# Patient Record
Sex: FEMALE | Race: BLACK OR AFRICAN AMERICAN | NOT HISPANIC OR LATINO
[De-identification: names, ages, dates, MRNs, and addresses within clinical notes are randomized per-mention and may not be internally consistent; named-entity substitution may affect disease eponyms.]

---
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
## 2021-07-02 ENCOUNTER — EMERGENCY (EMERGENCY)
Facility: HOSPITAL | Age: 21
LOS: 1 days | Discharge: HOME | End: 2021-07-02
Attending: STUDENT IN AN ORGANIZED HEALTH CARE EDUCATION/TRAINING PROGRAM
Payer: MEDICAID

## 2021-07-02 VITALS
RESPIRATION RATE: 19 BRPM | HEART RATE: 78 BPM | TEMPERATURE: 98 F | SYSTOLIC BLOOD PRESSURE: 110 MMHG | DIASTOLIC BLOOD PRESSURE: 68 MMHG

## 2021-07-02 VITALS
HEART RATE: 82 BPM | RESPIRATION RATE: 19 BRPM | SYSTOLIC BLOOD PRESSURE: 120 MMHG | DIASTOLIC BLOOD PRESSURE: 84 MMHG | TEMPERATURE: 98 F | WEIGHT: 293 LBS | OXYGEN SATURATION: 98 %

## 2021-07-02 LAB
ALBUMIN SERPL ELPH-MCNC: 3 G/DL — LOW (ref 3.5–5)
ALP SERPL-CCNC: 72 U/L — SIGNIFICANT CHANGE UP (ref 40–120)
ALT FLD-CCNC: 14 U/L DA — SIGNIFICANT CHANGE UP (ref 10–60)
ANION GAP SERPL CALC-SCNC: 8 MMOL/L — SIGNIFICANT CHANGE UP (ref 5–17)
AST SERPL-CCNC: 18 U/L — SIGNIFICANT CHANGE UP (ref 10–40)
BASOPHILS # BLD AUTO: 0.03 K/UL — SIGNIFICANT CHANGE UP (ref 0–0.2)
BASOPHILS NFR BLD AUTO: 0.4 % — SIGNIFICANT CHANGE UP (ref 0–2)
BILIRUB SERPL-MCNC: 0.3 MG/DL — SIGNIFICANT CHANGE UP (ref 0.2–1.2)
BUN SERPL-MCNC: 12 MG/DL — SIGNIFICANT CHANGE UP (ref 7–18)
CALCIUM SERPL-MCNC: 9 MG/DL — SIGNIFICANT CHANGE UP (ref 8.4–10.5)
CHLORIDE SERPL-SCNC: 106 MMOL/L — SIGNIFICANT CHANGE UP (ref 96–108)
CO2 SERPL-SCNC: 26 MMOL/L — SIGNIFICANT CHANGE UP (ref 22–31)
CREAT SERPL-MCNC: 0.87 MG/DL — SIGNIFICANT CHANGE UP (ref 0.5–1.3)
EOSINOPHIL # BLD AUTO: 0.03 K/UL — SIGNIFICANT CHANGE UP (ref 0–0.5)
EOSINOPHIL NFR BLD AUTO: 0.4 % — SIGNIFICANT CHANGE UP (ref 0–6)
GLUCOSE SERPL-MCNC: 100 MG/DL — HIGH (ref 70–99)
HCG SERPL-ACNC: <1 MIU/ML — SIGNIFICANT CHANGE UP
HCT VFR BLD CALC: 33.6 % — LOW (ref 34.5–45)
HGB BLD-MCNC: 10.2 G/DL — LOW (ref 11.5–15.5)
IMM GRANULOCYTES NFR BLD AUTO: 0.6 % — SIGNIFICANT CHANGE UP (ref 0–1.5)
LYMPHOCYTES # BLD AUTO: 1.64 K/UL — SIGNIFICANT CHANGE UP (ref 1–3.3)
LYMPHOCYTES # BLD AUTO: 24.5 % — SIGNIFICANT CHANGE UP (ref 13–44)
MAGNESIUM SERPL-MCNC: 2.1 MG/DL — SIGNIFICANT CHANGE UP (ref 1.6–2.6)
MCHC RBC-ENTMCNC: 22.5 PG — LOW (ref 27–34)
MCHC RBC-ENTMCNC: 30.4 GM/DL — LOW (ref 32–36)
MCV RBC AUTO: 74.2 FL — LOW (ref 80–100)
MONOCYTES # BLD AUTO: 0.5 K/UL — SIGNIFICANT CHANGE UP (ref 0–0.9)
MONOCYTES NFR BLD AUTO: 7.5 % — SIGNIFICANT CHANGE UP (ref 2–14)
NEUTROPHILS # BLD AUTO: 4.46 K/UL — SIGNIFICANT CHANGE UP (ref 1.8–7.4)
NEUTROPHILS NFR BLD AUTO: 66.6 % — SIGNIFICANT CHANGE UP (ref 43–77)
NRBC # BLD: 0 /100 WBCS — SIGNIFICANT CHANGE UP (ref 0–0)
PLATELET # BLD AUTO: 384 K/UL — SIGNIFICANT CHANGE UP (ref 150–400)
POTASSIUM SERPL-MCNC: 3.9 MMOL/L — SIGNIFICANT CHANGE UP (ref 3.5–5.3)
POTASSIUM SERPL-SCNC: 3.9 MMOL/L — SIGNIFICANT CHANGE UP (ref 3.5–5.3)
PROT SERPL-MCNC: 7.8 G/DL — SIGNIFICANT CHANGE UP (ref 6–8.3)
RBC # BLD: 4.53 M/UL — SIGNIFICANT CHANGE UP (ref 3.8–5.2)
RBC # FLD: 14.8 % — HIGH (ref 10.3–14.5)
SODIUM SERPL-SCNC: 140 MMOL/L — SIGNIFICANT CHANGE UP (ref 135–145)
TSH SERPL-MCNC: 1.52 UU/ML — SIGNIFICANT CHANGE UP (ref 0.34–4.82)
WBC # BLD: 6.7 K/UL — SIGNIFICANT CHANGE UP (ref 3.8–10.5)
WBC # FLD AUTO: 6.7 K/UL — SIGNIFICANT CHANGE UP (ref 3.8–10.5)

## 2021-07-02 PROCEDURE — 36415 COLL VENOUS BLD VENIPUNCTURE: CPT

## 2021-07-02 PROCEDURE — 99283 EMERGENCY DEPT VISIT LOW MDM: CPT

## 2021-07-02 PROCEDURE — 83735 ASSAY OF MAGNESIUM: CPT

## 2021-07-02 PROCEDURE — 80053 COMPREHEN METABOLIC PANEL: CPT

## 2021-07-02 PROCEDURE — 85025 COMPLETE CBC W/AUTO DIFF WBC: CPT

## 2021-07-02 PROCEDURE — 84702 CHORIONIC GONADOTROPIN TEST: CPT

## 2021-07-02 PROCEDURE — 84443 ASSAY THYROID STIM HORMONE: CPT

## 2021-07-02 PROCEDURE — 99284 EMERGENCY DEPT VISIT MOD MDM: CPT

## 2021-07-02 PROCEDURE — 93005 ELECTROCARDIOGRAM TRACING: CPT

## 2021-07-02 RX ORDER — SODIUM CHLORIDE 9 MG/ML
1000 INJECTION INTRAMUSCULAR; INTRAVENOUS; SUBCUTANEOUS ONCE
Refills: 0 | Status: COMPLETED | OUTPATIENT
Start: 2021-07-02 | End: 2021-07-02

## 2021-07-02 RX ADMIN — SODIUM CHLORIDE 1000 MILLILITER(S): 9 INJECTION INTRAMUSCULAR; INTRAVENOUS; SUBCUTANEOUS at 09:53

## 2021-07-02 NOTE — ED PROVIDER NOTE - PATIENT PORTAL LINK FT
You can access the FollowMyHealth Patient Portal offered by [facility] by registering at the following website: [email or web address]. By joining [employer]’s FollowMyHealth portal, you will also be able to view your health information using other applications (apps) compatible with our system.

## 2021-07-02 NOTE — ED PROVIDER NOTE - CLINICAL SUMMARY MEDICAL DECISION MAKING FREE TEXT BOX
21F presenting with lightheadedness, no syncope. has not been drinking water. likely dehydration. labs, fluids, will reassess.

## 2021-07-02 NOTE — ED PROVIDER NOTE - NSFOLLOWUPINSTRUCTIONS_ED_ALL_ED_FT
You were seen in the emegency department for lightheadedness.     Please follow-up with You were seen in the emergency department for lightheadedness.     Please follow-up with your primary care doctor in the next 24-48 hours.     Please continue to drink plenty of fluids.     If you have any worsening symptoms, severe headache, chest pain, shortness of breath or dizziness, please return ot the emergency department.

## 2021-10-27 ENCOUNTER — APPOINTMENT (OUTPATIENT)
Dept: SURGERY | Facility: CLINIC | Age: 21
End: 2021-10-27
Payer: COMMERCIAL

## 2021-10-27 VITALS
OXYGEN SATURATION: 100 % | DIASTOLIC BLOOD PRESSURE: 78 MMHG | WEIGHT: 293 LBS | SYSTOLIC BLOOD PRESSURE: 117 MMHG | HEIGHT: 63 IN | BODY MASS INDEX: 51.91 KG/M2 | HEART RATE: 71 BPM

## 2021-10-27 VITALS — TEMPERATURE: 96.8 F

## 2021-10-27 DIAGNOSIS — R42 DIZZINESS AND GIDDINESS: ICD-10-CM

## 2021-10-27 PROCEDURE — 99204 OFFICE O/P NEW MOD 45 MIN: CPT

## 2021-10-27 NOTE — HISTORY OF PRESENT ILLNESS
[de-identified] : JOSÉ MANUEL UMAÑA is a 21 year old female who present in the office with morbid obesity (BMI  55 kg/m2)  for bariatric surgery consultation. Patient was referred by MD Cristal Draper, The patient has tried multiple methods to lose weight in the past including diets, Calorie-counting, restricted intake, portion control and exercise without any long term success. The patient has been obese for many years. Patient feels that weight loss surgery is the only option at this point for effective and clinically meaningful weight loss. Patient seek weight loss surgery for improvement of their activity level, health and comorbid conditions. Her PMHX includes Asthma, Acid Reflux not on any medications, Anxiety, Depression.  Patient stated that she is not sure of which surgery she is interest at this time.

## 2021-10-27 NOTE — ASSESSMENT
[FreeTextEntry1] : JOSÉ MANUEL UMAÑA is a 21 year old female with morbid obesity (BMI  55.09 kg/m2) for Bariatric Surgery--she is unsure of the procedure.  Risks, benefits alternatives discussed. All questions answered. Risks discussed included: death, leak, stricture, DVT/PE, portomesenteric venous thrombosis, need for further procedures, tests or surgery, vitamin deficiency, gallstones, renal stones, inadequate weight loss, weight regain, need for open procedure, incisional hernia, bleeding, post-operative nausea/vomiting requiring hospitalization and intestinal obstruction.\par \par \par 1) Standard preoperative bariatric workup to include cardiology and pulmonology clearance, upper endoscopy with biopsy for H pylori, and preoperative fasting blood work, abdominal Ultrasound.\par 2) Patient will need to meet with psychologist and nutritionist for preoperative counseling regarding dietary goals, lifestyle changes, and postoperative expectations\par 3) We will assess the need for a medically-supervised diet, if required by the patient's insurer\par 4) Patient must attend 2 information session with bariatric coordinator and team\par 5) Patient should call insurance to ensure coverage for bariatric surgery\par 6) Bariatric Coordinator \par 7) Follow up 3 month \par 8) Patient wishes to have her weigh ins done with PCP

## 2021-10-27 NOTE — CONSULT LETTER
[Dear  ___] : Dear  [unfilled], [Consult Letter:] : I had the pleasure of evaluating your patient, [unfilled]. [Consult Closing:] : Thank you very much for allowing me to participate in the care of this patient.  If you have any questions, please do not hesitate to contact me. [Sincerely,] : Sincerely, [FreeTextEntry3] : Dr Garcia

## 2021-10-27 NOTE — PHYSICAL EXAM
[Obese] : obese [Normal] : affect appropriate [de-identified] : Normoactive bowel sounds, soft and nontender, no hepatosplenomegaly or masses noted,

## 2021-12-14 ENCOUNTER — APPOINTMENT (OUTPATIENT)
Dept: BARIATRICS | Facility: CLINIC | Age: 21
End: 2021-12-14

## 2021-12-27 ENCOUNTER — APPOINTMENT (OUTPATIENT)
Dept: CARDIOLOGY | Facility: CLINIC | Age: 21
End: 2021-12-27

## 2022-01-10 ENCOUNTER — APPOINTMENT (OUTPATIENT)
Dept: CARDIOLOGY | Facility: CLINIC | Age: 22
End: 2022-01-10
Payer: COMMERCIAL

## 2022-01-10 PROCEDURE — 99204 OFFICE O/P NEW MOD 45 MIN: CPT | Mod: 95

## 2022-01-14 ENCOUNTER — APPOINTMENT (OUTPATIENT)
Dept: GASTROENTEROLOGY | Facility: CLINIC | Age: 22
End: 2022-01-14
Payer: COMMERCIAL

## 2022-01-14 VITALS
OXYGEN SATURATION: 98 % | SYSTOLIC BLOOD PRESSURE: 115 MMHG | HEIGHT: 63 IN | HEART RATE: 94 BPM | TEMPERATURE: 97.8 F | BODY MASS INDEX: 51.91 KG/M2 | DIASTOLIC BLOOD PRESSURE: 69 MMHG | WEIGHT: 293 LBS

## 2022-01-14 DIAGNOSIS — Z01.818 ENCOUNTER FOR OTHER PREPROCEDURAL EXAMINATION: ICD-10-CM

## 2022-01-14 DIAGNOSIS — R11.0 NAUSEA: ICD-10-CM

## 2022-01-14 DIAGNOSIS — K62.5 HEMORRHAGE OF ANUS AND RECTUM: ICD-10-CM

## 2022-01-14 DIAGNOSIS — R19.7 DIARRHEA, UNSPECIFIED: ICD-10-CM

## 2022-01-14 PROCEDURE — 99204 OFFICE O/P NEW MOD 45 MIN: CPT

## 2022-01-14 NOTE — ASSESSMENT
[FreeTextEntry1] : Dyspepsia: The patient complains of dyspeptic symptoms.  The patient was advised to abide by an anti-gas (low FOD-MAP) diet.  The patient was given a pamphlet for anti-gas (low FOD-MAP).  The patient and I reviewed the anti-gas (low FOD-MAP) diet at length. The patient is to start on a trial of Simethicone one tablet 4 times a day p.r.n. abdominal pain and gas.\par GERD: The patient was advised to avoid late-night meals and dietary indiscretions.  The patient was advised to avoid fried and fatty foods.  The patient was advised to abide by an anti-GERD diet. The patient was given a pamphlet for anti-GERD.  The patient and I reviewed the anti-GERD diet at length. I recommend a trial of Pepcid 40 mg twice a day x 3 months for the symptoms.\par Nausea: The patient complains of nausea. If the symptoms of nausea persists, the patient may require a trial of Zofran 4 mg twice a day. \par Diarrhea: The patient complains of diarrhea.  I recommend a low residue diet. The patient is to avoid fiber supplementation. The patient is to consider starting a trial of a probiotic such as Align once a day.     The patient agreed and will follow-up to reassess the symptoms.  \par Rectal Bleeding: The patient had episodes of rectal bleeding.  The etiology of the rectal bleeding is unclear.  I recommend a trial of Anusol HC suppositories one per rectum QHS and Anusol HC 2.5% cream apply to affected area twice a day PRN hemorrhoidal bleeding or pain.  I recommend a trial of Calmoseptine cream apply to affected area twice a day for rectal discomfort. I recommend Tucks pads for the hemorrhoids.  I recommend starting Sitz baths twice a day for the hemorrhoids.  I recommend avoid wearing tight underwear and use boxers. I recommend avoid touching the perineal area. If the symptoms persist, I recommend a surgical evaluation for possible band ligation of the hemorrhoids. I recommend a flexible sigmoidoscopy to assess the site of bleeding. The patient was told of the risks and benefits of the procedure.  The patient was told of the risks of perforation, emergency surgery, bleeding, infections and missed lesions. The patient agrees and will follow up for the procedure.\par Obesity: The patient is morbidly obese.  The patient was advised to lose weight and exercise.  The patient was advised to followup with a nutritionist regarding dieting for the weight loss. The patient cannot lose weight.  The patient is being evaluated with bariatric surgeon.   The patient is being followed for bariatric surgery for obesity with Dr. Garcia for possible gastric sleeve surgery.  The patient agrees and will follow-up.  I recommend an upper endoscopy to assess for peptic ulcer disease versus esophagitis and any contraindications for bariatric surgery.  The patient was told of the risks and benefits of the procedure. The patient was told there was perforation, emergency surgery, bleeding, infections and missed lesions. The patient agreed and will schedule for the procedure. The patient was told to be n.p.o. after midnight. The patient is to return to the procedure.\par Upper Endoscopy: I recommend an upper endoscopy to assess for peptic ulcer disease versus esophagitis.  The patient was told of the risks and benefits of the procedure.  The patient was told of the risks of perforation, emergency surgery, bleeding, infections and missed lesions. The patient is to have a COVID 19 PCR nasopharyngeal swab performed at the approved sites 3 days prior to the procedure.  The patient is told not to drive, drink alcohol, use recreational drugs, exercise, or work the day of the procedure.  The patient was told of the need for an escort to accompany the patient home after the procedure. The patient is aware that the procedure may be cancelled if they fail to follow the directions.  The patient agreed and will schedule for the procedure. The patient is to be n.p.o. after midnight.  The patient is to return for the procedure.\par Sigmoidscopy: I recommend a flexible sigmoidoscopy to assess the symptoms of rectal bleeding.  The patient was told of the risks and benefits of the procedure.  The patient was told of the risks of perforation, emergency surgery, bleeding, infections and missed lesions.  The patient is to be on a clear liquid diet the entire day prior to the procedure. The patient is to complete the entire prescribed bowel prep the day prior to the procedure as directed. The patient is to have a COVID 19 PCR nasopharyngeal swab performed at the approved sites 3 days prior to the procedure.  The patient is told not to drive, drink alcohol, use recreational drugs, exercise, or work the day of the procedure.  The patient was told of the need for an escort to accompany the patient home after the procedure. The patient is aware that the procedure may be cancelled if they fail to follow the directions.  The patient agreed and will schedule for the procedure. The patient is to be n.p.o. after midnight and bowel prep was given.  The patient is to return for the procedure.\par Follow-up: The patient is to follow-up in the office in 4 weeks to reassess the symptoms. The patient was told to call the office if any further problems.\par \par \par \par \par \par

## 2022-01-25 ENCOUNTER — APPOINTMENT (OUTPATIENT)
Dept: PULMONOLOGY | Facility: CLINIC | Age: 22
End: 2022-01-25
Payer: COMMERCIAL

## 2022-01-25 VITALS — TEMPERATURE: 96.8 F

## 2022-01-25 VITALS — TEMPERATURE: 96.6 F

## 2022-01-25 DIAGNOSIS — G47.19 OTHER HYPERSOMNIA: ICD-10-CM

## 2022-01-25 DIAGNOSIS — R06.83 SNORING: ICD-10-CM

## 2022-01-25 PROCEDURE — 94010 BREATHING CAPACITY TEST: CPT

## 2022-01-25 PROCEDURE — 94729 DIFFUSING CAPACITY: CPT

## 2022-01-25 PROCEDURE — 94726 PLETHYSMOGRAPHY LUNG VOLUMES: CPT

## 2022-01-25 PROCEDURE — 99204 OFFICE O/P NEW MOD 45 MIN: CPT | Mod: 25

## 2022-01-25 PROCEDURE — ZZZZZ: CPT

## 2022-01-27 ENCOUNTER — TRANSCRIPTION ENCOUNTER (OUTPATIENT)
Age: 22
End: 2022-01-27

## 2022-02-17 ENCOUNTER — NON-APPOINTMENT (OUTPATIENT)
Age: 22
End: 2022-02-17

## 2022-03-09 ENCOUNTER — FORM ENCOUNTER (OUTPATIENT)
Age: 22
End: 2022-03-09

## 2022-03-10 ENCOUNTER — APPOINTMENT (OUTPATIENT)
Dept: SLEEP CENTER | Facility: CLINIC | Age: 22
End: 2022-03-10
Payer: COMMERCIAL

## 2022-03-10 ENCOUNTER — OUTPATIENT (OUTPATIENT)
Dept: OUTPATIENT SERVICES | Facility: HOSPITAL | Age: 22
LOS: 1 days | End: 2022-03-10
Payer: COMMERCIAL

## 2022-03-10 PROCEDURE — 95806 SLEEP STUDY UNATT&RESP EFFT: CPT

## 2022-03-10 PROCEDURE — 95806 SLEEP STUDY UNATT&RESP EFFT: CPT | Mod: 26

## 2022-03-17 ENCOUNTER — APPOINTMENT (OUTPATIENT)
Dept: ENDOCRINOLOGY | Facility: CLINIC | Age: 22
End: 2022-03-17
Payer: COMMERCIAL

## 2022-03-17 PROCEDURE — ZZZZZ: CPT

## 2022-03-17 NOTE — END OF VISIT
[] : Resident [FreeTextEntry3] : no clinical evidence of pulmonary pathology, good exercise tolerance. \par asthma well controlled, no clinically evident\par Pt reports significant snoring and daytime sleepiness, and should undergo a diagnostic home sleep study to assess for any evidence of NADINE. If found to be moderate or severe, would recommend 2-3 weeks of appropriate CPAP treatment prior to any surgical intervention.

## 2022-03-17 NOTE — HISTORY OF PRESENT ILLNESS
[Never] : never [TextBox_4] : Patient is a 21 year old female, with PMH of asthma, vertigo and morbid obesity, who presented to the clinic for clearance for bariatric surgery. Patient states her history of asthma is old and does not bother her currently. Denies any recent albuterol use. Denies any medication use. Denies any allergies. Denies any smoking or use of illicit drugs or vaping. Social alcohol use. Patient states that she wakes up throughout the night most nights. Most of the time patient is able to fall back asleep right away. Sometimes patient feels sleepy during the day. Denies any sensation of gasping for air. Has some SOB on exertion. Denies any chest pain, nausea, vomiting, abdominal pain, diarrhea or constipation. Patient is following with Dr. Garcia for bariatric surgery. Patient is in process of seeing nutritionist. Patient has seen cardiology and GI. Patient planned to undergo EGD on 3/3/22. Patient also planned to undergo stress test. Patient still needs to see psychiatrist.

## 2022-03-17 NOTE — CONSULT LETTER
[Dear  ___] : Dear  [unfilled], [Consult Letter:] : I had the pleasure of evaluating your patient, [unfilled]. [Please see my note below.] : Please see my note below. [Consult Closing:] : Thank you very much for allowing me to participate in the care of this patient.  If you have any questions, please do not hesitate to contact me. [Sincerely,] : Sincerely, [FreeTextEntry3] : Rosio Gonzalez MD, [facility]

## 2022-03-17 NOTE — ASSESSMENT
[FreeTextEntry1] : Patient is a 21 year old female, with PMH of asthma, vertigo and morbid obesity, who presented to the clinic for clearance for bariatric surgery.\par \par no clinical evidence of pulmonary pathology, good exercise tolerance. \par PFTs done in office showing borderline obstructive defect \par CXR clear\par HST with mild NADINE, no CPAP treatment indicated at this time, weight loss recommended as primary therapy\par \par \par Pt is optimized from pulmonary and sleep standpoint to undergo elective bariatric surgery under general anesthesia - recommend routine paul and postop care.

## 2022-03-17 NOTE — REASON FOR VISIT
[Initial] : an initial visit [Pre-op Risk Stratification] : pre-op risk stratification [TextBox_44] : bariatric surgery

## 2022-03-21 DIAGNOSIS — G47.33 OBSTRUCTIVE SLEEP APNEA (ADULT) (PEDIATRIC): ICD-10-CM

## 2022-03-22 ENCOUNTER — TRANSCRIPTION ENCOUNTER (OUTPATIENT)
Age: 22
End: 2022-03-22

## 2022-03-24 ENCOUNTER — OUTPATIENT (OUTPATIENT)
Dept: OUTPATIENT SERVICES | Facility: HOSPITAL | Age: 22
LOS: 1 days | Discharge: HOME | End: 2022-03-24
Payer: COMMERCIAL

## 2022-03-24 ENCOUNTER — APPOINTMENT (OUTPATIENT)
Dept: GASTROENTEROLOGY | Facility: HOSPITAL | Age: 22
End: 2022-03-24

## 2022-03-24 ENCOUNTER — RESULT REVIEW (OUTPATIENT)
Age: 22
End: 2022-03-24

## 2022-03-24 DIAGNOSIS — K62.5 HEMORRHAGE OF ANUS AND RECTUM: ICD-10-CM

## 2022-03-24 DIAGNOSIS — K21.9 GASTRO-ESOPHAGEAL REFLUX DISEASE WITHOUT ESOPHAGITIS: ICD-10-CM

## 2022-03-24 DIAGNOSIS — E66.01 MORBID (SEVERE) OBESITY DUE TO EXCESS CALORIES: ICD-10-CM

## 2022-03-24 DIAGNOSIS — R10.13 EPIGASTRIC PAIN: ICD-10-CM

## 2022-03-24 DIAGNOSIS — R11.0 NAUSEA: ICD-10-CM

## 2022-03-24 LAB — HCG UR QL: NEGATIVE — SIGNIFICANT CHANGE UP

## 2022-03-24 PROCEDURE — 43239 EGD BIOPSY SINGLE/MULTIPLE: CPT

## 2022-03-24 PROCEDURE — 45385 COLONOSCOPY W/LESION REMOVAL: CPT

## 2022-03-24 PROCEDURE — 45331 SIGMOIDOSCOPY AND BIOPSY: CPT

## 2022-03-24 PROCEDURE — 88305 TISSUE EXAM BY PATHOLOGIST: CPT

## 2022-03-24 PROCEDURE — 81025 URINE PREGNANCY TEST: CPT

## 2022-03-24 PROCEDURE — 88312 SPECIAL STAINS GROUP 1: CPT

## 2022-03-24 PROCEDURE — 88312 SPECIAL STAINS GROUP 1: CPT | Mod: 26

## 2022-03-24 PROCEDURE — 88305 TISSUE EXAM BY PATHOLOGIST: CPT | Mod: 26

## 2022-03-24 DEVICE — CATH BALLOON DIL 6-8MM: Type: IMPLANTABLE DEVICE | Status: FUNCTIONAL

## 2022-03-24 DEVICE — CATH ESOPH DIL 9 ATM 6FR 8-10MM: Type: IMPLANTABLE DEVICE | Status: FUNCTIONAL

## 2022-03-24 DEVICE — CATH ESOPH DIL 8 ATM 6FR10-12M: Type: IMPLANTABLE DEVICE | Status: FUNCTIONAL

## 2022-03-28 ENCOUNTER — TRANSCRIPTION ENCOUNTER (OUTPATIENT)
Age: 22
End: 2022-03-28

## 2022-03-29 LAB — SURGICAL PATHOLOGY STUDY: SIGNIFICANT CHANGE UP

## 2022-03-30 ENCOUNTER — NON-APPOINTMENT (OUTPATIENT)
Age: 22
End: 2022-03-30

## 2022-04-11 ENCOUNTER — APPOINTMENT (OUTPATIENT)
Dept: ENDOCRINOLOGY | Facility: CLINIC | Age: 22
End: 2022-04-11
Payer: COMMERCIAL

## 2022-04-11 PROCEDURE — ZZZZZ: CPT

## 2022-04-14 ENCOUNTER — APPOINTMENT (OUTPATIENT)
Dept: CV DIAGNOSTICS | Facility: HOSPITAL | Age: 22
End: 2022-04-14

## 2022-04-22 ENCOUNTER — APPOINTMENT (OUTPATIENT)
Dept: GASTROENTEROLOGY | Facility: CLINIC | Age: 22
End: 2022-04-22
Payer: COMMERCIAL

## 2022-04-22 VITALS
OXYGEN SATURATION: 100 % | SYSTOLIC BLOOD PRESSURE: 116 MMHG | DIASTOLIC BLOOD PRESSURE: 70 MMHG | WEIGHT: 293 LBS | BODY MASS INDEX: 51.91 KG/M2 | TEMPERATURE: 96.3 F | HEART RATE: 85 BPM | HEIGHT: 63 IN

## 2022-04-22 DIAGNOSIS — K63.5 POLYP OF COLON: ICD-10-CM

## 2022-04-22 DIAGNOSIS — K29.30 CHRONIC SUPERFICIAL GASTRITIS W/OUT BLEEDING: ICD-10-CM

## 2022-04-22 DIAGNOSIS — K64.8 OTHER HEMORRHOIDS: ICD-10-CM

## 2022-04-22 PROCEDURE — 99213 OFFICE O/P EST LOW 20 MIN: CPT

## 2022-04-22 RX ORDER — PANTOPRAZOLE 40 MG/1
40 TABLET, DELAYED RELEASE ORAL DAILY
Qty: 30 | Refills: 3 | Status: ACTIVE | COMMUNITY
Start: 2022-04-22 | End: 1900-01-01

## 2022-04-22 NOTE — HISTORY OF PRESENT ILLNESS
[None] : had no significant interval events [Heartburn] : denies heartburn [Nausea] : denies nausea [Vomiting] : denies vomiting [Diarrhea] : denies diarrhea [Constipation] : denies constipation [Yellow Skin Or Eyes (Jaundice)] : denies jaundice [Abdominal Pain] : denies abdominal pain [Abdominal Swelling] : denies abdominal swelling [Rectal Pain] : denies rectal pain [Hiatus Hernia] : hiatus hernia [Wt Gain ___ Lbs] : no recent weight gain [Wt Loss ___ Lbs] : no recent weight loss [GERD] : no gastroesophageal reflux disease [Peptic Ulcer Disease] : no peptic ulcer disease [Pancreatitis] : no pancreatitis [Cholelithiasis] : no cholelithiasis [Kidney Stone] : no kidney stone [Inflammatory Bowel Disease] : no inflammatory bowel disease [Irritable Bowel Syndrome] : no irritable bowel syndrome [Diverticulitis] : no diverticulitis [Alcohol Abuse] : no alcohol abuse [Malignancy] : no malignancy [Abdominal Surgery] : no abdominal surgery [Appendectomy] : no appendectomy [Cholecystectomy] : no cholecystectomy [de-identified] : The patient states that she is feeling fine.  The patient is being followed for bariatric surgery for obesity with Dr. Garcia.  The patient is s/p upper endoscopy and biopsy for pre-operative evaluation for bariatric surgery. The patient denies any abdominal pain.  The patient denies any abdominal gas and bloating.  The patient denies any nausea or vomiting.  The patient denies any gastroesophageal reflux disease or dysphagia.  The patient complains of dyspnea upon exertion but denies any atypical chest pain, shortness of breath or palpitations.  The patient denies any diaphoresis. The patient denies any constipation or diarrhea.  The patient has 1 to 2 bowel movements a day. The patient denies a change in bowel habits.  The patient denies a change in caliber of stool.  The patient denies having mucus discharge with the bowel movements.  The patient denies any bright red blood per rectum, melena or hematemesis.  The patient denies any rectal pain or rectal pruritus.  The patient denies any weight loss or anorexia.  The patient denies any jaundice or pruritus.  The patient denies any chronic lower back pain.  She denies any fevers or chills.  The patient had an upper endoscopy and flexible sigmoidoscopy to the splenic flexure and transverse colon performed at the [facility] endoscopy suite on March 24, 2022. The upper endoscopy was performed up to the level of the second portion of the duodenum. The upper endoscopy revealed mild diffuse gastritis. Biopsies were taken of the distal esophagus, antrum, body of stomach and duodenum to assess for esophagitis, gastritis and duodenitis. The gastric pathology performed on March 24, 2022 revealed esophageal mucosa with fragments of unremarkable squamous esophageal epithelium with a PASF stain that is negative for fungal microorganisms (esophagus), gastric antral and body mucosa with chronic inactive gastritis that was negative for Helicobacter pylori (antrum and body of the stomach) and duodenal mucosa with preserved villous architecture with no parasites or increased intraepithelial lymphocytes identified (duodenum). The flexible sigmoidoscopy to the splenic flexure and transverse colon revealed a recto-sigmoid colon polyp, a poor prep flexible sigmoidoscopy and small internal hemorrhoids.  The study was limited secondary to retained liquid and solid stool scattered throughout the colon but no gross lesions were noted.  Unable to comment on small colonic polyps or masses secondary to the poor prep.  The colonic polyp was snared and removed.  There was minimal bleeding post polypectomy. The colonic polyp was lost in the stool and not retrieved.  There were no other polyps, masses, diverticulosis, AVMs or colitis noted.  The patient tolerated the procedures well.  The patient denies any significant family history of GI problems. [de-identified] : (-) smoking, (-) ETOH, (-) IVDA\par

## 2022-04-22 NOTE — ASSESSMENT
[FreeTextEntry1] : Gastritis: The patient has a history of gastritis noted on prior upper endoscopy. The patient is to avoid nonsteroidal anti-inflammatory drugs and aspirin. I recommend a trial of pantoprazole 40 mg once a day for 3 months for the symptoms. \par Colonic Polyp: The patient was found to have colonic polyps on prior colonoscopy.  I recommend a repeat colonoscopy in 5 years to reassess for colonic polyps pending patient’s health unless symptomatic.  The patient agreed and will follow up for the procedure. \par Internal Hemorrhoids: The patient is to consider starting a trial of Anusol H. C. suppositories one per rectum nightly and Anusol HC2 .5% cream apply to affected area twice a day p.r.n. hemorrhoidal bleeding or pain. I also recommend a trial of Calmoseptine cream apply to affected area twice a day for hemorrhoidal discomfort.  I recommend Tucks pads for the hemorrhoids.  I recommend Sitz baths twice a day for the hemorrhoids.  I recommend avoid wearing tight underwear and use boxers.  I recommend avoid touching the perineal area.  If the symptoms persist, I recommend a surgical evaluation for possible band ligation of the hemorrhoids. The patient was given the name of colorectal surgeon, Dr. Andrea Shaw at [facility] for possible surgery.   The patient agreed to followup. \par Poor Prep Flexible Sigmoidoscopy: The patient had a recent poor prep flexible sigmoidoscopy. I recommend a repeat flexible sigmoidoscopy versus colonoscopy in 5 years to reassess for colonic polyps secondary to the poor prep.  The patient was told of the risks and benefits of the procedure.  The patient was told of the risks of perforation, emergency surgery, bleeding, infections and missed lesions. The patient agreed and will return for the procedure.\par Obesity: The patient is morbidly obese.  The patient was advised to lose weight and exercise.  The patient was advised to continue followup with a nutritionist regarding dieting for the weight loss. The patient cannot lose weight.  The patient is s/p upper endoscopy and has no contraindications to proceeding with bariatric surgery.   The patient is being evaluated with bariatric surgeon.   The patient is being followed for bariatric surgery for obesity with Dr. Garcia.  The patient agrees and will followup.  \par Follow-up: The patient is to follow-up in the office in 6 months to reassess the symptoms. The patient was told to call the office if any further problems. \par

## 2022-04-29 ENCOUNTER — APPOINTMENT (OUTPATIENT)
Dept: CV DIAGNOSTICS | Facility: HOSPITAL | Age: 22
End: 2022-04-29

## 2022-04-29 ENCOUNTER — OUTPATIENT (OUTPATIENT)
Dept: OUTPATIENT SERVICES | Facility: HOSPITAL | Age: 22
LOS: 1 days | End: 2022-04-29
Payer: COMMERCIAL

## 2022-04-29 DIAGNOSIS — R07.9 CHEST PAIN, UNSPECIFIED: ICD-10-CM

## 2022-04-29 PROCEDURE — 93016 CV STRESS TEST SUPVJ ONLY: CPT | Mod: GC

## 2022-04-29 PROCEDURE — 93018 CV STRESS TEST I&R ONLY: CPT | Mod: GC

## 2022-05-12 ENCOUNTER — APPOINTMENT (OUTPATIENT)
Dept: CV DIAGNOSITCS | Facility: HOSPITAL | Age: 22
End: 2022-05-12
Payer: COMMERCIAL

## 2022-05-12 ENCOUNTER — OUTPATIENT (OUTPATIENT)
Dept: OUTPATIENT SERVICES | Facility: HOSPITAL | Age: 22
LOS: 1 days | End: 2022-05-12

## 2022-05-12 DIAGNOSIS — R06.00 DYSPNEA, UNSPECIFIED: ICD-10-CM

## 2022-05-12 DIAGNOSIS — Z01.810 ENCOUNTER FOR PREPROCEDURAL CARDIOVASCULAR EXAMINATION: ICD-10-CM

## 2022-05-12 LAB — HCG UR QL: NEGATIVE — SIGNIFICANT CHANGE UP

## 2022-05-12 PROCEDURE — 93306 TTE W/DOPPLER COMPLETE: CPT | Mod: 26

## 2022-05-16 ENCOUNTER — NON-APPOINTMENT (OUTPATIENT)
Age: 22
End: 2022-05-16

## 2022-05-16 ENCOUNTER — APPOINTMENT (OUTPATIENT)
Dept: CARDIOLOGY | Facility: CLINIC | Age: 22
End: 2022-05-16
Payer: COMMERCIAL

## 2022-05-16 VITALS
BODY MASS INDEX: 51.91 KG/M2 | HEART RATE: 79 BPM | RESPIRATION RATE: 16 BRPM | TEMPERATURE: 97.9 F | SYSTOLIC BLOOD PRESSURE: 98 MMHG | WEIGHT: 293 LBS | HEIGHT: 63 IN | DIASTOLIC BLOOD PRESSURE: 66 MMHG | OXYGEN SATURATION: 100 %

## 2022-05-16 DIAGNOSIS — Z13.6 ENCOUNTER FOR SCREENING FOR CARDIOVASCULAR DISORDERS: ICD-10-CM

## 2022-05-16 DIAGNOSIS — Z01.810 ENCOUNTER FOR PREPROCEDURAL CARDIOVASCULAR EXAMINATION: ICD-10-CM

## 2022-05-16 DIAGNOSIS — R07.89 OTHER CHEST PAIN: ICD-10-CM

## 2022-05-16 PROCEDURE — 99214 OFFICE O/P EST MOD 30 MIN: CPT

## 2022-05-16 PROCEDURE — 93000 ELECTROCARDIOGRAM COMPLETE: CPT

## 2022-05-24 ENCOUNTER — NON-APPOINTMENT (OUTPATIENT)
Age: 22
End: 2022-05-24

## 2022-06-06 NOTE — ED PROVIDER NOTE - NS ED MD DISPO DISCHARGE
"Chief Complaint   Patient presents with     Ear Problem     Ear check.  Follow up Infective OE right       Initial /64 (BP Location: Right arm, Cuff Size: Adult Regular)   Pulse 71   Temp 97.4  F (36.3  C) (Tympanic)   Ht 1.676 m (5' 6\")   Wt 86.2 kg (190 lb)   SpO2 99%   BMI 30.67 kg/m   Estimated body mass index is 30.67 kg/m  as calculated from the following:    Height as of this encounter: 1.676 m (5' 6\").    Weight as of this encounter: 86.2 kg (190 lb).  Medication Reconciliation: complete  Jacquie Palma LPN    "
Home

## 2022-06-14 ENCOUNTER — APPOINTMENT (OUTPATIENT)
Dept: SURGERY | Facility: CLINIC | Age: 22
End: 2022-06-14
Payer: COMMERCIAL

## 2022-06-14 VITALS
WEIGHT: 293 LBS | DIASTOLIC BLOOD PRESSURE: 76 MMHG | HEART RATE: 94 BPM | SYSTOLIC BLOOD PRESSURE: 135 MMHG | HEIGHT: 63 IN | BODY MASS INDEX: 51.91 KG/M2

## 2022-06-14 VITALS — TEMPERATURE: 97.3 F

## 2022-06-14 DIAGNOSIS — R06.00 DYSPNEA, UNSPECIFIED: ICD-10-CM

## 2022-06-14 DIAGNOSIS — R10.13 EPIGASTRIC PAIN: ICD-10-CM

## 2022-06-14 DIAGNOSIS — Z78.9 OTHER SPECIFIED HEALTH STATUS: ICD-10-CM

## 2022-06-14 DIAGNOSIS — K21.9 GASTRO-ESOPHAGEAL REFLUX DISEASE W/OUT ESOPHAGITIS: ICD-10-CM

## 2022-06-14 PROCEDURE — 99213 OFFICE O/P EST LOW 20 MIN: CPT

## 2022-06-14 RX ORDER — CEPHALEXIN 500 MG/1
500 CAPSULE ORAL
Qty: 30 | Refills: 0 | Status: DISCONTINUED | COMMUNITY
Start: 2022-03-17 | End: 2022-06-14

## 2022-06-15 DIAGNOSIS — E55.9 VITAMIN D DEFICIENCY, UNSPECIFIED: ICD-10-CM

## 2022-06-15 RX ORDER — ERGOCALCIFEROL 1.25 MG/1
1.25 MG CAPSULE, LIQUID FILLED ORAL
Qty: 5 | Refills: 2 | Status: DISCONTINUED | COMMUNITY
Start: 2022-06-15 | End: 2022-06-15

## 2022-06-15 RX ORDER — CHOLECALCIFEROL (VITAMIN D3) 1250 MCG
1.25 MG CAPSULE ORAL
Qty: 8 | Refills: 3 | Status: ACTIVE | COMMUNITY
Start: 2022-06-15 | End: 1900-01-01

## 2022-06-16 LAB
25(OH)D3 SERPL-MCNC: 10.1 NG/ML
ALBUMIN SERPL ELPH-MCNC: 4 G/DL
ALP BLD-CCNC: 83 U/L
ALT SERPL-CCNC: 11 U/L
ANION GAP SERPL CALC-SCNC: 12 MMOL/L
APTT BLD: 28.7 SEC
AST SERPL-CCNC: 20 U/L
BASOPHILS # BLD AUTO: 0.05 K/UL
BASOPHILS NFR BLD AUTO: 0.7 %
BILIRUB SERPL-MCNC: <0.2 MG/DL
BUN SERPL-MCNC: 12 MG/DL
CALCIUM SERPL-MCNC: 9.8 MG/DL
CHLORIDE SERPL-SCNC: 103 MMOL/L
CHOLEST SERPL-MCNC: 187 MG/DL
CO2 SERPL-SCNC: 24 MMOL/L
CREAT SERPL-MCNC: 1 MG/DL
EGFR: 82 ML/MIN/1.73M2
EOSINOPHIL # BLD AUTO: 0.05 K/UL
EOSINOPHIL NFR BLD AUTO: 0.7 %
ESTIMATED AVERAGE GLUCOSE: 120 MG/DL
FERRITIN SERPL-MCNC: 59 NG/ML
FOLATE SERPL-MCNC: 4 NG/ML
GLUCOSE SERPL-MCNC: 87 MG/DL
HBA1C MFR BLD HPLC: 5.8 %
HCT VFR BLD CALC: 35.9 %
HDLC SERPL-MCNC: 40 MG/DL
HGB BLD-MCNC: 10.5 G/DL
IMM GRANULOCYTES NFR BLD AUTO: 0.4 %
INR PPP: 1.04 RATIO
IRON SATN MFR SERPL: 18 %
IRON SERPL-MCNC: 61 UG/DL
LDLC SERPL CALC-MCNC: 123 MG/DL
LYMPHOCYTES # BLD AUTO: 2.07 K/UL
LYMPHOCYTES NFR BLD AUTO: 30.4 %
MAN DIFF?: NORMAL
MCHC RBC-ENTMCNC: 22.3 PG
MCHC RBC-ENTMCNC: 29.2 GM/DL
MCV RBC AUTO: 76.2 FL
MONOCYTES # BLD AUTO: 0.63 K/UL
MONOCYTES NFR BLD AUTO: 9.3 %
NEUTROPHILS # BLD AUTO: 3.98 K/UL
NEUTROPHILS NFR BLD AUTO: 58.5 %
NONHDLC SERPL-MCNC: 147 MG/DL
PLATELET # BLD AUTO: 413 K/UL
POTASSIUM SERPL-SCNC: 4.5 MMOL/L
PROT SERPL-MCNC: 7.6 G/DL
PT BLD: 12.3 SEC
RBC # BLD: 4.71 M/UL
RBC # FLD: 15.4 %
SODIUM SERPL-SCNC: 139 MMOL/L
TIBC SERPL-MCNC: 334 UG/DL
TRIGL SERPL-MCNC: 119 MG/DL
TSH SERPL-ACNC: 1.93 UIU/ML
UIBC SERPL-MCNC: 273 UG/DL
VIT B12 SERPL-MCNC: 370 PG/ML
WBC # FLD AUTO: 6.81 K/UL

## 2022-06-20 LAB — VIT B1 SERPL-MCNC: 82.5 NMOL/L

## 2022-06-29 ENCOUNTER — NON-APPOINTMENT (OUTPATIENT)
Age: 22
End: 2022-06-29

## 2022-07-09 NOTE — ASSESSMENT
[FreeTextEntry1] : JOSÉ MANUEL UMAÑA is a 22 year old female with morbid obesity (BMI 56.51 kg/m2) who present in the office for pre-operative follow-up and weight management program in preparation for Robotic assisted laparoscopic duodenal switch\par \par \par Today patient is doing well, offers no complaints. Patient stated that she has all the clearances. Patient was advised that her chart will be reviewed and We will plan for surgery on at the next available date, pending any required insurance pre-certification or pre-approval. O-L Flap Text: The defect edges were debeveled with a #15 scalpel blade.  Given the location of the defect, shape of the defect and the proximity to free margins an O-L flap was deemed most appropriate.  Using a sterile surgical marker, an appropriate advancement flap was drawn incorporating the defect and placing the expected incisions within the relaxed skin tension lines where possible.    The area thus outlined was incised deep to adipose tissue with a #15 scalpel blade.  The skin margins were undermined to an appropriate distance in all directions utilizing iris scissors.

## 2022-07-09 NOTE — PLAN
[FreeTextEntry1] : Please follow up at the office for preop visit and as needed for any questions or concerns

## 2022-07-09 NOTE — CONSULT LETTER
[Dear  ___] : Dear  [unfilled], [Courtesy Letter:] : I had the pleasure of seeing your patient, [unfilled], in my office today. [Consult Closing:] : Thank you very much for allowing me to participate in the care of this patient.  If you have any questions, please do not hesitate to contact me. [Sincerely,] : Sincerely, [FreeTextEntry3] : Dr Mcqueen

## 2022-07-09 NOTE — PHYSICAL EXAM
[Obese] : obese [Normal] : affect appropriate [de-identified] : Normoactive bowel sounds, soft and nontender, no hepatosplenomegaly or masses noted,

## 2022-07-09 NOTE — HISTORY OF PRESENT ILLNESS
[de-identified] : JOSÉ MANUEL UMAÑA is a 22 year old female with morbid obesity (BMI 56.51 kg/m2) who present in the office for pre-operative follow-up and weight management program in preparation for Robotic assisted laparoscopic duodenal switch. Patient seeing by following specialties\par \par - Gastroenterology underwent an upper endoscopy. pathology was negative for H. pylori. \par - Pulmonary underwent PFT, Sleep study. Cleared to proceed with bariatric surgery\par - Cardiology- From the cardiac perspective, the patient may proceed with bariatric surgery\par - Psychology- cleared to proceed with bariatric surgery\par - Registered Dietitian proceed with bariatric surgery\par - She has attended our bariatric information session\par - Labs will be done today

## 2022-07-11 ENCOUNTER — TRANSCRIPTION ENCOUNTER (OUTPATIENT)
Age: 22
End: 2022-07-11

## 2022-07-21 ENCOUNTER — EMERGENCY (EMERGENCY)
Facility: HOSPITAL | Age: 22
LOS: 1 days | Discharge: HOME | End: 2022-07-21
Attending: EMERGENCY MEDICINE
Payer: COMMERCIAL

## 2022-07-21 VITALS
HEART RATE: 96 BPM | TEMPERATURE: 98 F | DIASTOLIC BLOOD PRESSURE: 68 MMHG | WEIGHT: 293 LBS | OXYGEN SATURATION: 97 % | SYSTOLIC BLOOD PRESSURE: 103 MMHG | RESPIRATION RATE: 18 BRPM | HEIGHT: 63 IN

## 2022-07-21 PROCEDURE — 10061 I&D ABSCESS COMP/MULTIPLE: CPT

## 2022-07-21 PROCEDURE — 99283 EMERGENCY DEPT VISIT LOW MDM: CPT | Mod: 25

## 2022-07-21 PROCEDURE — 99282 EMERGENCY DEPT VISIT SF MDM: CPT | Mod: 25

## 2022-07-21 NOTE — ED PROVIDER NOTE - OBJECTIVE STATEMENT
23 y/o female, history of vertigo, presents for left axillary cyst vs abscess. Patient states that she noticed it a week ago. It became firm and started draining, however still present in the left axilla. Went to her PCP who sent her to the ER to get cyst drained. Is not taking any antibiotics. Denies any fevers, chills. No known drug allergies.

## 2022-07-21 NOTE — ED PROVIDER NOTE - NS ED ATTENDING STATEMENT MOD
This was a shared visit with the LIO. I reviewed and verified the documentation and independently performed the documented:

## 2022-07-21 NOTE — ED ADULT NURSE NOTE - ISOLATION TYPE:
PORSHA on CKD 4  Baseline creatinine 3 3-3 8 in past year  Patient follows Dr Kayode Selby as outpatient  On Demadex 40mg p o  Daily in AM and can take additional 20 mg p o  In the afternoon if leg edema or weight gain  Patient states she barely takes additional dose in the afternoon  Creatinine 6 32 on presentation with Anion gap 18, bicarb 19, potassium normal  ABG showed metabolic acidosis  No significant improvement with IV fluid, creatinine remains 6 1  Now deemed ESRD  CPK within normal limit  Nephrology following, input appreciated  Likely disease progression possibly multifactorial secondary to decrease ECV due to pulmonary hypertension,? Anticoagulated related nephropathy, progressive nephrotic range proteinuria  Status post IR guided PD catheter placement on 07/17  Reports mild pain at the site of PD catheter  · Continue Demadex  · Daily weight and I&Os  · Follow-up labs  · Avoid nephrotoxins and hypotension  · Plan to initiate PD next week as outpatient, scheduled for 7/20 at 2:30 p m   At [facility]  · Follow-up with Nephrology after discharge      Results from last 7 days   Lab Units 07/19/20  0506 07/18/20  0546 07/17/20  0454 07/16/20  0557 07/15/20  0615 07/14/20  0549 07/13/20  1317   BUN mg/dL 98* 93* 100* 101* 100* 96* 95*   CREATININE mg/dL 6 45* 5 95* 6 06* 6 10* 6 19* 6 22* 6 32* None

## 2022-07-21 NOTE — ED PROVIDER NOTE - CLINICAL SUMMARY MEDICAL DECISION MAKING FREE TEXT BOX
Cyst drained without complication.  Patient to f/u with plastics, will start patient on abx due to surrounding cellulitis.  Strict return precautions provided, education on abx also provided.

## 2022-07-21 NOTE — ED PROVIDER NOTE - PATIENT PORTAL LINK FT
You can access the FollowMyHealth Patient Portal offered by [facility] by registering at the following website: [email or web address]. By joining [employer]’s FollowMyHealth portal, you will also be able to view your health information using other applications (apps) compatible with our system.

## 2022-07-21 NOTE — ED PROVIDER NOTE - PHYSICAL EXAMINATION
Skin: left anterior axillary 2 cm x 1 cm area of fluctuance w/ some skin erosion on the surface w/ mild surrounding erythema; no warmth or streaking; ultrasound shows area of fluid w/ underlying cobblestoning

## 2022-07-21 NOTE — ED ADULT NURSE NOTE - NSIMPLEMENTINTERV_GEN_ALL_ED
Implemented All Universal Safety Interventions:  [place] to call system. Call bell, personal items and telephone within reach. Instruct patient to call for assistance. Room bathroom lighting operational. Non-slip footwear when patient is off stretcher. Physically safe environment: no spills, clutter or unnecessary equipment. Stretcher in lowest position, wheels locked, appropriate side rails in place.

## 2022-07-24 ENCOUNTER — EMERGENCY (EMERGENCY)
Facility: HOSPITAL | Age: 22
LOS: 1 days | Discharge: HOME | End: 2022-07-24
Attending: EMERGENCY MEDICINE
Payer: COMMERCIAL

## 2022-07-24 VITALS
TEMPERATURE: 98 F | RESPIRATION RATE: 18 BRPM | WEIGHT: 293 LBS | DIASTOLIC BLOOD PRESSURE: 74 MMHG | OXYGEN SATURATION: 98 % | HEIGHT: 63 IN | HEART RATE: 98 BPM | SYSTOLIC BLOOD PRESSURE: 107 MMHG

## 2022-07-24 PROCEDURE — 99282 EMERGENCY DEPT VISIT SF MDM: CPT

## 2022-07-24 NOTE — ED PROVIDER NOTE - OBJECTIVE STATEMENT
23 y/o female, history of vertigo, presents for left axillary wound check.  Was here 3 days ago, had incision and drainage.  States is getting smaller, no fevers, chills, is on abx. 21 y/o female, history of vertigo, presents for left axillary wound check.  Was here 3 days ago, had incision and drainage.  States is getting smaller, no fevers, chills, is on abx.  States packing that was placed already fell out.

## 2022-07-24 NOTE — ED PROVIDER NOTE - CLINICAL SUMMARY MEDICAL DECISION MAKING FREE TEXT BOX
Abscess / cyst improving both on exam and on patient account.  Will discharge, patient to f/u with pcp and plastics, return to ER precautions provided.

## 2022-07-24 NOTE — ED PROVIDER NOTE - ATTENDING APP SHARED VISIT CONTRIBUTION OF CARE
seen with acp  here for follow up of absess  packing fell out   wound improved no further drainage  agree with acps assessment hx and physical and disposition

## 2022-07-24 NOTE — ED PROVIDER NOTE - PHYSICAL EXAMINATION
GEN:   comfortable, in no apparent distress, AOx3  EYES:   PERRL, extra-occular movements intact  HEENT:   airway patent, moist mucosal membranes, uvula midline  CV:  RRR, Pulses- Radial: 2+ bilateral and equal  RESP:   clear to auscultation bilaterally, non-labored, speaking in full sentences  ABD:   soft, non tender, no guarding  :   no cva tenderness  MSK:   no musculoskeletal tenderness, 5/5 strength, moving all extremities  SKIN: left anterior axillary 2 cm x 1 cm area of fluctuance w/ some skin erosion on the surface and draining. no warmth or streaking of surrounding soft tissue;   NEURO:   AOx3, no focal weakness or loss of sensation, gait normal, GCS 15  PSYCH: calm, cooperative, no apparent risk to self and others

## 2022-07-24 NOTE — ED PROVIDER NOTE - PATIENT PORTAL LINK FT
You can access the FollowMyHealth Patient Portal offered by [facility] by registering at the following website: [email or web address]. By joining [employer]’s FollowMyHealth portal, you will also be able to view your health information using other applications (apps) compatible with our system.

## 2022-07-26 ENCOUNTER — OUTPATIENT (OUTPATIENT)
Dept: OUTPATIENT SERVICES | Facility: HOSPITAL | Age: 22
LOS: 1 days | End: 2022-07-26
Payer: COMMERCIAL

## 2022-07-26 ENCOUNTER — APPOINTMENT (OUTPATIENT)
Dept: SURGERY | Facility: CLINIC | Age: 22
End: 2022-07-26

## 2022-07-26 VITALS
DIASTOLIC BLOOD PRESSURE: 64 MMHG | RESPIRATION RATE: 18 BRPM | SYSTOLIC BLOOD PRESSURE: 93 MMHG | TEMPERATURE: 97 F | HEART RATE: 84 BPM | WEIGHT: 293 LBS | OXYGEN SATURATION: 98 % | HEIGHT: 63 IN

## 2022-07-26 VITALS
DIASTOLIC BLOOD PRESSURE: 78 MMHG | OXYGEN SATURATION: 96 % | HEART RATE: 65 BPM | WEIGHT: 293 LBS | HEIGHT: 63 IN | BODY MASS INDEX: 51.91 KG/M2 | SYSTOLIC BLOOD PRESSURE: 122 MMHG

## 2022-07-26 VITALS — TEMPERATURE: 97.7 F

## 2022-07-26 DIAGNOSIS — E66.01 MORBID (SEVERE) OBESITY DUE TO EXCESS CALORIES: ICD-10-CM

## 2022-07-26 DIAGNOSIS — Z01.818 ENCOUNTER FOR OTHER PREPROCEDURAL EXAMINATION: ICD-10-CM

## 2022-07-26 LAB
25(OH)D3 SERPL-MCNC: 50.4 NG/ML
ANION GAP SERPL CALC-SCNC: 5 MMOL/L — SIGNIFICANT CHANGE UP (ref 5–17)
BLD GP AB SCN SERPL QL: SIGNIFICANT CHANGE UP
BUN SERPL-MCNC: 8 MG/DL — SIGNIFICANT CHANGE UP (ref 7–18)
CALCIUM SERPL-MCNC: 9.5 MG/DL — SIGNIFICANT CHANGE UP (ref 8.4–10.5)
CHLORIDE SERPL-SCNC: 107 MMOL/L — SIGNIFICANT CHANGE UP (ref 96–108)
CO2 SERPL-SCNC: 25 MMOL/L — SIGNIFICANT CHANGE UP (ref 22–31)
CREAT SERPL-MCNC: 0.97 MG/DL — SIGNIFICANT CHANGE UP (ref 0.5–1.3)
EGFR: 85 ML/MIN/1.73M2 — SIGNIFICANT CHANGE UP
GLUCOSE SERPL-MCNC: 84 MG/DL — SIGNIFICANT CHANGE UP (ref 70–99)
HCT VFR BLD CALC: 35.7 % — SIGNIFICANT CHANGE UP (ref 34.5–45)
HGB BLD-MCNC: 10.8 G/DL — LOW (ref 11.5–15.5)
MCHC RBC-ENTMCNC: 22.7 PG — LOW (ref 27–34)
MCHC RBC-ENTMCNC: 30.3 GM/DL — LOW (ref 32–36)
MCV RBC AUTO: 75.2 FL — LOW (ref 80–100)
NRBC # BLD: 0 /100 WBCS — SIGNIFICANT CHANGE UP (ref 0–0)
PLATELET # BLD AUTO: 385 K/UL — SIGNIFICANT CHANGE UP (ref 150–400)
POTASSIUM SERPL-MCNC: 4 MMOL/L — SIGNIFICANT CHANGE UP (ref 3.5–5.3)
POTASSIUM SERPL-SCNC: 4 MMOL/L — SIGNIFICANT CHANGE UP (ref 3.5–5.3)
RBC # BLD: 4.75 M/UL — SIGNIFICANT CHANGE UP (ref 3.8–5.2)
RBC # FLD: 15 % — HIGH (ref 10.3–14.5)
SODIUM SERPL-SCNC: 137 MMOL/L — SIGNIFICANT CHANGE UP (ref 135–145)
WBC # BLD: 5.97 K/UL — SIGNIFICANT CHANGE UP (ref 3.8–10.5)
WBC # FLD AUTO: 5.97 K/UL — SIGNIFICANT CHANGE UP (ref 3.8–10.5)

## 2022-07-26 PROCEDURE — 99213 OFFICE O/P EST LOW 20 MIN: CPT

## 2022-07-26 PROCEDURE — G0463: CPT

## 2022-07-26 NOTE — HISTORY OF PRESENT ILLNESS
[de-identified] : JOSÉ MANUEL UMAÑA is a 22 year old female has undergone extensive workup and has been deemed an appropriate candidate for ARTHUR-S. At this preop appointment, I discussed with the patient the risks, benefits, and alternatives to bariatric surgery. I specifically discussed the risks of DVT/PE, pneumonia, dehydration, injury to the stomach and surrounding organs, and anesthesia risks including cardiac and pulmonary complications. I discussed pre and post-operative diet protocols, to begin with a two week partial liquid diet. I discussed the importance of exercise in both the pre and post-operative time periods. The patient had ample opportunity to ask questions and all questions were answered. \par \par Patient stated that she had and incision and drainage of left axilla abscess. Incision site is healing and granulating well, She is on oral antibiotics, was advised to stop today\par \par \par

## 2022-07-26 NOTE — ASSESSMENT
[FreeTextEntry1] : JOSÉ MANUEL UMAÑA is a 22 year old female for ARTHUR-S  preop visit. Risks, benefits alternatives discussed. All questions answered. Risks discussed included: death, leak, stricture, DVT/PE, portomesenteric venous thrombosis, need for further procedures, tests or surgery, vitamin deficiency, gallstones, renal stones, inadequate weight loss, weight regain, need for open procedure, incisional hernia, bleeding, post-operative nausea/vomiting requiring hospitalization and intestinal obstruction.

## 2022-07-26 NOTE — H&P PST ADULT - HISTORY OF PRESENT ILLNESS
22 years old female with PMH gastric reflux, vertigo presents to PST today for presurgical evaluation 22 years old female with PMH morbid obesity, gastric reflux, vertigo, depression with c/o failure to loose weight despite trying several diet modalities, exercise portion control, calorie reduction and weight management. Patient is here today for presurgical testing for scheduled robotic assisted laparoscopic duodenal switch possible open on 8/4/22.

## 2022-07-26 NOTE — H&P PST ADULT - PROBLEM SELECTOR PLAN 1
Patient  scheduled robotic assisted laparoscopic duodenal switch possible open on 8/4/22.  Preop instruction given both verbal and written, instructed to take shower the morning of surgery with chlorhexidine wash, bottle provided.   Stop bang score is 2, low risk for NADINE.  Medical optimization done, report to be obtained.

## 2022-07-26 NOTE — PHYSICAL EXAM
[Obese] : obese [Normal] : affect appropriate [de-identified] : Normoactive bowel sounds, soft and nontender, no hepatosplenomegaly or masses noted,

## 2022-07-26 NOTE — REASON FOR VISIT
[Follow-Up Visit] : a follow-up visit for [Morbid Obesity (BMI>40)] : morbid obesity (bmi>40) [FreeTextEntry2] : PreOP

## 2022-07-26 NOTE — H&P PST ADULT - ADMIT DATE
Pt called and asked if you can release her results through [facility] from her recent labs.    26-Jul-2022

## 2022-07-26 NOTE — H&P PST ADULT - ASSESSMENT
22 years old female with PMH morbid obesity, gastric reflux, vertigo, depression with c/o failure to loose weight despite trying several diet modalities, exercise portion control, calorie reduction and weight management. Patient is here today for presurgical testing for scheduled robotic assisted laparoscopic duodenal switch possible open on 8/4/22.

## 2022-07-30 ENCOUNTER — NON-APPOINTMENT (OUTPATIENT)
Age: 22
End: 2022-07-30

## 2022-08-04 ENCOUNTER — APPOINTMENT (OUTPATIENT)
Dept: SURGERY | Facility: HOSPITAL | Age: 22
End: 2022-08-04

## 2022-08-16 ENCOUNTER — APPOINTMENT (OUTPATIENT)
Dept: SURGERY | Facility: CLINIC | Age: 22
End: 2022-08-16

## 2022-09-06 ENCOUNTER — APPOINTMENT (OUTPATIENT)
Dept: SURGERY | Facility: CLINIC | Age: 22
End: 2022-09-06

## 2022-11-29 PROBLEM — R42 DIZZINESS AND GIDDINESS: Chronic | Status: ACTIVE | Noted: 2022-08-08

## 2022-11-29 PROBLEM — K21.9 GASTRO-ESOPHAGEAL REFLUX DISEASE WITHOUT ESOPHAGITIS: Chronic | Status: ACTIVE | Noted: 2022-07-26

## 2022-11-29 PROBLEM — E66.9 OBESITY, UNSPECIFIED: Chronic | Status: ACTIVE | Noted: 2022-07-26

## 2022-11-29 PROBLEM — R42 DIZZINESS AND GIDDINESS: Chronic | Status: ACTIVE | Noted: 2022-07-26

## 2022-11-30 ENCOUNTER — APPOINTMENT (OUTPATIENT)
Dept: GASTROENTEROLOGY | Facility: CLINIC | Age: 22
End: 2022-11-30

## 2022-12-06 ENCOUNTER — APPOINTMENT (OUTPATIENT)
Dept: SURGERY | Facility: CLINIC | Age: 22
End: 2022-12-06

## 2022-12-06 VITALS
SYSTOLIC BLOOD PRESSURE: 110 MMHG | HEIGHT: 63 IN | HEART RATE: 91 BPM | WEIGHT: 293 LBS | BODY MASS INDEX: 51.91 KG/M2 | DIASTOLIC BLOOD PRESSURE: 71 MMHG

## 2022-12-06 DIAGNOSIS — E66.01 MORBID (SEVERE) OBESITY DUE TO EXCESS CALORIES: ICD-10-CM

## 2022-12-06 PROCEDURE — 99213 OFFICE O/P EST LOW 20 MIN: CPT

## 2022-12-06 NOTE — ASSESSMENT
[FreeTextEntry1] : JOSÉ MANUEL UMAÑA is a 22 year old female with morbid obesity (BMI 56.15 kg/m2) who present in the office for pre-operative follow-up and weight management program in preparation for Laparoscopic Gastric Bypass \par \par Today patient is doing well, offers no complaints. Patient stated that she has all the clearances. Patient chart was reviewed and We will plan for surgery on at the next available date, pending any required insurance pre-certification or pre-approval\par \par Patient received the consent form for  Laparoscopic Gastric Bypass and will return to discuss it at the pre-op visit

## 2022-12-06 NOTE — HISTORY OF PRESENT ILLNESS
[de-identified] : JOSÉ MANUEL UMAÑA is a 22 year old female with morbid obesity (BMI 56.15 kg/m2) who present in the office for pre-operative follow-up and weight management program in preparation for ARTHUR-S. ARTHUR canceled due to dickson issue. Ms. UMAÑA is interested in Robotic Assisted Laparoscopic Gastric Bypass. Today patient is doing well, offers no complaints. Patient stated that she has all the clearances. We will plan for surgery on at the next available date, pending any required insurance pre-certification or pre-approval

## 2022-12-06 NOTE — PHYSICAL EXAM
[Obese] : obese [Normal] : affect appropriate [de-identified] : Normoactive bowel sounds, soft and nontender, no hepatosplenomegaly or masses noted,

## 2022-12-06 NOTE — REASON FOR VISIT
[Follow-Up Visit] : a follow-up visit for [Morbid Obesity (BMI>40)] : morbid obesity (bmi>40) [FreeTextEntry2] : For Laparoscopic Gastric Bypass

## 2022-12-28 ENCOUNTER — OUTPATIENT (OUTPATIENT)
Dept: OUTPATIENT SERVICES | Facility: HOSPITAL | Age: 22
LOS: 1 days | End: 2022-12-28
Payer: COMMERCIAL

## 2022-12-28 VITALS
WEIGHT: 293 LBS | DIASTOLIC BLOOD PRESSURE: 76 MMHG | HEIGHT: 63 IN | HEART RATE: 70 BPM | OXYGEN SATURATION: 97 % | SYSTOLIC BLOOD PRESSURE: 107 MMHG | TEMPERATURE: 98 F | RESPIRATION RATE: 16 BRPM

## 2022-12-28 DIAGNOSIS — Z01.818 ENCOUNTER FOR OTHER PREPROCEDURAL EXAMINATION: ICD-10-CM

## 2022-12-28 DIAGNOSIS — E66.01 MORBID (SEVERE) OBESITY DUE TO EXCESS CALORIES: ICD-10-CM

## 2022-12-28 LAB
ALBUMIN SERPL ELPH-MCNC: 3.5 G/DL — SIGNIFICANT CHANGE UP (ref 3.5–5)
ALP SERPL-CCNC: 75 U/L — SIGNIFICANT CHANGE UP (ref 40–120)
ALT FLD-CCNC: 16 U/L DA — SIGNIFICANT CHANGE UP (ref 10–60)
ANION GAP SERPL CALC-SCNC: 7 MMOL/L — SIGNIFICANT CHANGE UP (ref 5–17)
APTT BLD: 24.5 SEC — LOW (ref 27.5–35.5)
AST SERPL-CCNC: 16 U/L — SIGNIFICANT CHANGE UP (ref 10–40)
BILIRUB SERPL-MCNC: 0.3 MG/DL — SIGNIFICANT CHANGE UP (ref 0.2–1.2)
BLD GP AB SCN SERPL QL: SIGNIFICANT CHANGE UP
BUN SERPL-MCNC: 11 MG/DL — SIGNIFICANT CHANGE UP (ref 7–18)
CALCIUM SERPL-MCNC: 9.8 MG/DL — SIGNIFICANT CHANGE UP (ref 8.4–10.5)
CHLORIDE SERPL-SCNC: 108 MMOL/L — SIGNIFICANT CHANGE UP (ref 96–108)
CO2 SERPL-SCNC: 24 MMOL/L — SIGNIFICANT CHANGE UP (ref 22–31)
CREAT SERPL-MCNC: 0.94 MG/DL — SIGNIFICANT CHANGE UP (ref 0.5–1.3)
EGFR: 88 ML/MIN/1.73M2 — SIGNIFICANT CHANGE UP
GLUCOSE SERPL-MCNC: 95 MG/DL — SIGNIFICANT CHANGE UP (ref 70–99)
HCG SERPL-ACNC: <1 MIU/ML — SIGNIFICANT CHANGE UP
HCT VFR BLD CALC: 35.3 % — SIGNIFICANT CHANGE UP (ref 34.5–45)
HGB BLD-MCNC: 10.5 G/DL — LOW (ref 11.5–15.5)
INR BLD: 1.12 RATIO — SIGNIFICANT CHANGE UP (ref 0.88–1.16)
MCHC RBC-ENTMCNC: 22.2 PG — LOW (ref 27–34)
MCHC RBC-ENTMCNC: 29.7 GM/DL — LOW (ref 32–36)
MCV RBC AUTO: 74.5 FL — LOW (ref 80–100)
NRBC # BLD: 0 /100 WBCS — SIGNIFICANT CHANGE UP (ref 0–0)
PLATELET # BLD AUTO: 373 K/UL — SIGNIFICANT CHANGE UP (ref 150–400)
POTASSIUM SERPL-MCNC: 4.3 MMOL/L — SIGNIFICANT CHANGE UP (ref 3.5–5.3)
POTASSIUM SERPL-SCNC: 4.3 MMOL/L — SIGNIFICANT CHANGE UP (ref 3.5–5.3)
PROT SERPL-MCNC: 8.5 G/DL — HIGH (ref 6–8.3)
PROTHROM AB SERPL-ACNC: 13.3 SEC — SIGNIFICANT CHANGE UP (ref 10.5–13.4)
RBC # BLD: 4.74 M/UL — SIGNIFICANT CHANGE UP (ref 3.8–5.2)
RBC # FLD: 15 % — HIGH (ref 10.3–14.5)
SODIUM SERPL-SCNC: 139 MMOL/L — SIGNIFICANT CHANGE UP (ref 135–145)
WBC # BLD: 6.7 K/UL — SIGNIFICANT CHANGE UP (ref 3.8–10.5)
WBC # FLD AUTO: 6.7 K/UL — SIGNIFICANT CHANGE UP (ref 3.8–10.5)

## 2022-12-28 PROCEDURE — G0463: CPT

## 2022-12-28 NOTE — H&P PST ADULT - HISTORY OF PRESENT ILLNESS
22 y.o female with PMHx og GERD and Vertigo and significant Hx for Morbid Severe Obesity due to Excess Calories,    here for presurgical testing for scheduled Robotic Assisted  Laparoscopic  Gastric bypass  Possible Open , Possible Esophagogastroduodenoscopy  with Dr. Mcqueen on 1/6/23

## 2022-12-28 NOTE — H&P PST ADULT - ASSESSMENT
22 y.o female with Morbid Severe Obesity due to Excess Calories,  schedule for Robotic Assisted  Laparoscopic  Gastric bypass  Possible Open , Possible Esophagogastroduodenoscopy with Dr. Mcqueen on 1/6/23  Confirmed Mild NADINE- CPAP recommended in sleep study

## 2022-12-28 NOTE — H&P PST ADULT - NSICDXPROCEDURE_GEN_ALL_CORE_FT
PROCEDURES:  Robot-assisted laparoscopic Kriss-en-Y gastric bypass 28-Dec-2022 16:16:04  Usha Baum

## 2022-12-28 NOTE — H&P PST ADULT - RESPIRATORY
clear to auscultation bilaterally/no wheezes/no rales/no rhonchi/no respiratory distress/good air movement

## 2022-12-28 NOTE — H&P PST ADULT - PROBLEM SELECTOR PLAN 1
Problem: Morbid obesity due to excess calories.   Plan: Patient is scheduled for schedule for Robotic Assisted  Laparoscopic  Gastric bypass  Possible Open , Possible Esophagogastroduodenoscopy with Dr. Mcqueen on 1/6/23  Pt was instructed to obtain Medical Clearance   Cardiology / Pulmonary- clearance in chart/ sleep study also in chart   Labs drawn in PST 12/28/22- will f/u results   Pt was instructed to f/u with covid test 3-5 days prior to surgery  Pt was  instructed to Stop all Aspirin and over the counter medication including vitamins and herbal supplements 7days before surgery  Pt will Notify  surgeon if he is are having symptoms such as fever, cold, flu-like symptoms .  Maintaining NPO status post midnight day before surgery  Patient is to expect a phone call day before surgery between the hours of 430- 630pm giving arrival time for surgery day.  Instructions given to include using 4% chlorhexidine wash as directed starting 3days before day of surgery (inclusive of day of surgery)  Written and oral preoperative instructions given to patient with understanding verbalized.     Confirmed Mild NADINE- CPAP recommended in sleep study

## 2023-01-02 ENCOUNTER — NON-APPOINTMENT (OUTPATIENT)
Age: 23
End: 2023-01-02

## 2023-01-05 ENCOUNTER — TRANSCRIPTION ENCOUNTER (OUTPATIENT)
Age: 23
End: 2023-01-05

## 2023-01-06 ENCOUNTER — APPOINTMENT (OUTPATIENT)
Dept: SURGERY | Facility: HOSPITAL | Age: 23
End: 2023-01-06

## 2023-01-06 ENCOUNTER — TRANSCRIPTION ENCOUNTER (OUTPATIENT)
Age: 23
End: 2023-01-06

## 2023-01-06 ENCOUNTER — INPATIENT (INPATIENT)
Facility: HOSPITAL | Age: 23
LOS: 0 days | Discharge: HOME | DRG: 621 | End: 2023-01-07
Attending: SURGERY | Admitting: SURGERY
Payer: COMMERCIAL

## 2023-01-06 VITALS
DIASTOLIC BLOOD PRESSURE: 58 MMHG | WEIGHT: 293 LBS | OXYGEN SATURATION: 98 % | RESPIRATION RATE: 16 BRPM | HEART RATE: 97 BPM | SYSTOLIC BLOOD PRESSURE: 104 MMHG | HEIGHT: 63 IN | TEMPERATURE: 98 F

## 2023-01-06 DIAGNOSIS — E66.01 MORBID (SEVERE) OBESITY DUE TO EXCESS CALORIES: ICD-10-CM

## 2023-01-06 LAB
BLD GP AB SCN SERPL QL: SIGNIFICANT CHANGE UP
GLUCOSE BLDC GLUCOMTR-MCNC: 112 MG/DL — HIGH (ref 70–99)
GLUCOSE BLDC GLUCOMTR-MCNC: 131 MG/DL — HIGH (ref 70–99)
GLUCOSE BLDC GLUCOMTR-MCNC: 131 MG/DL — HIGH (ref 70–99)
HCG UR QL: NEGATIVE — SIGNIFICANT CHANGE UP

## 2023-01-06 PROCEDURE — 43645 LAP GASTR BYPASS INCL SMLL I: CPT | Mod: AS

## 2023-01-06 PROCEDURE — 43645 LAP GASTR BYPASS INCL SMLL I: CPT

## 2023-01-06 DEVICE — CLIP APPLIER COVIDIEN ENDOCLIP II 10MM MED/LG: Type: IMPLANTABLE DEVICE | Status: FUNCTIONAL

## 2023-01-06 DEVICE — XI STAPLER SUREFORM RELOAD 60 WHITE: Type: IMPLANTABLE DEVICE | Status: FUNCTIONAL

## 2023-01-06 RX ORDER — SODIUM CHLORIDE 9 MG/ML
1000 INJECTION, SOLUTION INTRAVENOUS
Refills: 0 | Status: DISCONTINUED | OUTPATIENT
Start: 2023-01-06 | End: 2023-01-07

## 2023-01-06 RX ORDER — HYDROMORPHONE HYDROCHLORIDE 2 MG/ML
0.5 INJECTION INTRAMUSCULAR; INTRAVENOUS; SUBCUTANEOUS
Refills: 0 | Status: DISCONTINUED | OUTPATIENT
Start: 2023-01-06 | End: 2023-01-07

## 2023-01-06 RX ORDER — FENTANYL CITRATE 50 UG/ML
25 INJECTION INTRAVENOUS
Refills: 0 | Status: DISCONTINUED | OUTPATIENT
Start: 2023-01-06 | End: 2023-01-06

## 2023-01-06 RX ORDER — ONDANSETRON 8 MG/1
4 TABLET, FILM COATED ORAL ONCE
Refills: 0 | Status: DISCONTINUED | OUTPATIENT
Start: 2023-01-06 | End: 2023-01-07

## 2023-01-06 RX ORDER — SODIUM CHLORIDE 9 MG/ML
3 INJECTION INTRAMUSCULAR; INTRAVENOUS; SUBCUTANEOUS EVERY 8 HOURS
Refills: 0 | Status: DISCONTINUED | OUTPATIENT
Start: 2023-01-06 | End: 2023-01-06

## 2023-01-06 RX ORDER — FENTANYL CITRATE 50 UG/ML
50 INJECTION INTRAVENOUS
Refills: 0 | Status: DISCONTINUED | OUTPATIENT
Start: 2023-01-06 | End: 2023-01-07

## 2023-01-06 RX ORDER — DEXTROSE 50 % IN WATER 50 %
15 SYRINGE (ML) INTRAVENOUS ONCE
Refills: 0 | Status: DISCONTINUED | OUTPATIENT
Start: 2023-01-06 | End: 2023-01-07

## 2023-01-06 RX ORDER — METOCLOPRAMIDE HCL 10 MG
10 TABLET ORAL
Refills: 0 | Status: DISCONTINUED | OUTPATIENT
Start: 2023-01-06 | End: 2023-01-07

## 2023-01-06 RX ORDER — DEXTROSE 50 % IN WATER 50 %
12.5 SYRINGE (ML) INTRAVENOUS ONCE
Refills: 0 | Status: DISCONTINUED | OUTPATIENT
Start: 2023-01-06 | End: 2023-01-07

## 2023-01-06 RX ORDER — ONDANSETRON 8 MG/1
4 TABLET, FILM COATED ORAL
Refills: 0 | Status: DISCONTINUED | OUTPATIENT
Start: 2023-01-06 | End: 2023-01-07

## 2023-01-06 RX ORDER — DEXTROSE 50 % IN WATER 50 %
25 SYRINGE (ML) INTRAVENOUS ONCE
Refills: 0 | Status: DISCONTINUED | OUTPATIENT
Start: 2023-01-06 | End: 2023-01-07

## 2023-01-06 RX ORDER — INSULIN LISPRO 100/ML
VIAL (ML) SUBCUTANEOUS AT BEDTIME
Refills: 0 | Status: DISCONTINUED | OUTPATIENT
Start: 2023-01-06 | End: 2023-01-07

## 2023-01-06 RX ORDER — HYOSCYAMINE SULFATE 0.13 MG
0.12 TABLET ORAL EVERY 4 HOURS
Refills: 0 | Status: DISCONTINUED | OUTPATIENT
Start: 2023-01-06 | End: 2023-01-07

## 2023-01-06 RX ORDER — INSULIN LISPRO 100/ML
VIAL (ML) SUBCUTANEOUS
Refills: 0 | Status: DISCONTINUED | OUTPATIENT
Start: 2023-01-06 | End: 2023-01-07

## 2023-01-06 RX ORDER — PANTOPRAZOLE SODIUM 20 MG/1
40 TABLET, DELAYED RELEASE ORAL EVERY 24 HOURS
Refills: 0 | Status: DISCONTINUED | OUTPATIENT
Start: 2023-01-06 | End: 2023-01-07

## 2023-01-06 RX ORDER — KETOROLAC TROMETHAMINE 30 MG/ML
30 SYRINGE (ML) INJECTION
Refills: 0 | Status: COMPLETED | OUTPATIENT
Start: 2023-01-06 | End: 2023-01-07

## 2023-01-06 RX ORDER — ONDANSETRON 8 MG/1
4 TABLET, FILM COATED ORAL ONCE
Refills: 0 | Status: COMPLETED | OUTPATIENT
Start: 2023-01-06 | End: 2023-01-06

## 2023-01-06 RX ORDER — FENTANYL CITRATE 50 UG/ML
25 INJECTION INTRAVENOUS
Refills: 0 | Status: DISCONTINUED | OUTPATIENT
Start: 2023-01-06 | End: 2023-01-07

## 2023-01-06 RX ORDER — HYDROMORPHONE HYDROCHLORIDE 2 MG/ML
0.5 INJECTION INTRAMUSCULAR; INTRAVENOUS; SUBCUTANEOUS
Refills: 0 | Status: DISCONTINUED | OUTPATIENT
Start: 2023-01-06 | End: 2023-01-06

## 2023-01-06 RX ORDER — GLUCAGON INJECTION, SOLUTION 0.5 MG/.1ML
1 INJECTION, SOLUTION SUBCUTANEOUS ONCE
Refills: 0 | Status: DISCONTINUED | OUTPATIENT
Start: 2023-01-06 | End: 2023-01-07

## 2023-01-06 RX ORDER — ENOXAPARIN SODIUM 100 MG/ML
40 INJECTION SUBCUTANEOUS ONCE
Refills: 0 | Status: COMPLETED | OUTPATIENT
Start: 2023-01-06 | End: 2023-01-06

## 2023-01-06 RX ORDER — ACETAMINOPHEN 500 MG
1000 TABLET ORAL
Refills: 0 | Status: COMPLETED | OUTPATIENT
Start: 2023-01-06 | End: 2023-01-07

## 2023-01-06 RX ORDER — ENOXAPARIN SODIUM 100 MG/ML
40 INJECTION SUBCUTANEOUS EVERY 12 HOURS
Refills: 0 | Status: DISCONTINUED | OUTPATIENT
Start: 2023-01-06 | End: 2023-01-07

## 2023-01-06 RX ORDER — SCOPALAMINE 1 MG/3D
1 PATCH, EXTENDED RELEASE TRANSDERMAL ONCE
Refills: 0 | Status: COMPLETED | OUTPATIENT
Start: 2023-01-06 | End: 2023-01-06

## 2023-01-06 RX ADMIN — ONDANSETRON 4 MILLIGRAM(S): 8 TABLET, FILM COATED ORAL at 20:38

## 2023-01-06 RX ADMIN — ENOXAPARIN SODIUM 40 MILLIGRAM(S): 100 INJECTION SUBCUTANEOUS at 09:49

## 2023-01-06 RX ADMIN — Medication 1000 MILLIGRAM(S): at 21:00

## 2023-01-06 RX ADMIN — SCOPALAMINE 1 PATCH: 1 PATCH, EXTENDED RELEASE TRANSDERMAL at 09:48

## 2023-01-06 RX ADMIN — HYDROMORPHONE HYDROCHLORIDE 0.5 MILLIGRAM(S): 2 INJECTION INTRAMUSCULAR; INTRAVENOUS; SUBCUTANEOUS at 18:08

## 2023-01-06 RX ADMIN — ONDANSETRON 4 MILLIGRAM(S): 8 TABLET, FILM COATED ORAL at 15:45

## 2023-01-06 RX ADMIN — Medication 10 MILLIGRAM(S): at 23:28

## 2023-01-06 RX ADMIN — Medication 400 MILLIGRAM(S): at 20:38

## 2023-01-06 RX ADMIN — HYDROMORPHONE HYDROCHLORIDE 0.5 MILLIGRAM(S): 2 INJECTION INTRAMUSCULAR; INTRAVENOUS; SUBCUTANEOUS at 18:30

## 2023-01-06 RX ADMIN — SODIUM CHLORIDE 75 MILLILITER(S): 9 INJECTION, SOLUTION INTRAVENOUS at 18:21

## 2023-01-06 RX ADMIN — ENOXAPARIN SODIUM 40 MILLIGRAM(S): 100 INJECTION SUBCUTANEOUS at 19:12

## 2023-01-06 RX ADMIN — SCOPALAMINE 1 PATCH: 1 PATCH, EXTENDED RELEASE TRANSDERMAL at 19:36

## 2023-01-06 RX ADMIN — Medication 30 MILLIGRAM(S): at 23:28

## 2023-01-06 NOTE — PROGRESS NOTE ADULT - ASSESSMENT
22y.o. Female s/p robotic gastric bypass    -Awaiting void  -Sips of water  -Pain control standing, breakthrough pain med avail  -DVT ppx  -Incentive spirometry  -Antiemesis standing    NADINE  -CPAP

## 2023-01-06 NOTE — BRIEF OPERATIVE NOTE - NSICDXBRIEFPROCEDURE_GEN_ALL_CORE_FT
PROCEDURES:  Robot-assisted laparoscopic creation of gastric bypass 06-Jan-2023 14:37:01  Yunier Zaragoza

## 2023-01-06 NOTE — ASU PREOP CHECKLIST - WARM FLUIDS/WARM BLANKETS
Chief complaint sore throat    History present illness 29-year-old lady presents with a sore throat and a positive rapid strep screen.  Her mother had the first strep in the family and she also has a son that is completing a course of antibiotics for strep throat she has took total of 5 children.  She has no other complaints no ear discomfort no cough no fever chills    Past medical history reviewed and unremarkable    Review of systems as noted in HPI or is otherwise unremarkable    Physical exam alert cooperative patient does not appear to be in any distress  HEENT is noteworthy for markedly enlarged inflamed tonsils with minimal exudate formation, TMs are normal, eyes and nose are normal  Neck is supple with small tender lymph nodes anteriorly located cardiopulmonary exam is unremarkable    Assessment and plan acute strep throat confirmed by screening and clinically and therefore will be treated with oral Zithromax as she has a history of rash from penicillin   no

## 2023-01-07 ENCOUNTER — TRANSCRIPTION ENCOUNTER (OUTPATIENT)
Age: 23
End: 2023-01-07

## 2023-01-07 VITALS
TEMPERATURE: 98 F | RESPIRATION RATE: 19 BRPM | OXYGEN SATURATION: 99 % | HEART RATE: 74 BPM | DIASTOLIC BLOOD PRESSURE: 52 MMHG | SYSTOLIC BLOOD PRESSURE: 113 MMHG

## 2023-01-07 LAB
A1C WITH ESTIMATED AVERAGE GLUCOSE RESULT: 5.9 % — HIGH (ref 4–5.6)
ANION GAP SERPL CALC-SCNC: 10 MMOL/L — SIGNIFICANT CHANGE UP (ref 5–17)
ANISOCYTOSIS BLD QL: SLIGHT — SIGNIFICANT CHANGE UP
BASOPHILS # BLD AUTO: 0.01 K/UL — SIGNIFICANT CHANGE UP (ref 0–0.2)
BASOPHILS NFR BLD AUTO: 0.1 % — SIGNIFICANT CHANGE UP (ref 0–2)
BUN SERPL-MCNC: 8 MG/DL — SIGNIFICANT CHANGE UP (ref 7–18)
CALCIUM SERPL-MCNC: 9.5 MG/DL — SIGNIFICANT CHANGE UP (ref 8.4–10.5)
CHLORIDE SERPL-SCNC: 106 MMOL/L — SIGNIFICANT CHANGE UP (ref 96–108)
CO2 SERPL-SCNC: 22 MMOL/L — SIGNIFICANT CHANGE UP (ref 22–31)
CREAT SERPL-MCNC: 0.82 MG/DL — SIGNIFICANT CHANGE UP (ref 0.5–1.3)
EGFR: 104 ML/MIN/1.73M2 — SIGNIFICANT CHANGE UP
ELLIPTOCYTES BLD QL SMEAR: SLIGHT — SIGNIFICANT CHANGE UP
EOSINOPHIL # BLD AUTO: 0 K/UL — SIGNIFICANT CHANGE UP (ref 0–0.5)
EOSINOPHIL NFR BLD AUTO: 0 % — SIGNIFICANT CHANGE UP (ref 0–6)
ESTIMATED AVERAGE GLUCOSE: 123 MG/DL — HIGH (ref 68–114)
GLUCOSE BLDC GLUCOMTR-MCNC: 78 MG/DL — SIGNIFICANT CHANGE UP (ref 70–99)
GLUCOSE BLDC GLUCOMTR-MCNC: 97 MG/DL — SIGNIFICANT CHANGE UP (ref 70–99)
GLUCOSE SERPL-MCNC: 102 MG/DL — HIGH (ref 70–99)
HCT VFR BLD CALC: 34 % — LOW (ref 34.5–45)
HGB BLD-MCNC: 10.2 G/DL — LOW (ref 11.5–15.5)
HYPOCHROMIA BLD QL: SLIGHT — SIGNIFICANT CHANGE UP
IMM GRANULOCYTES NFR BLD AUTO: 0.3 % — SIGNIFICANT CHANGE UP (ref 0–0.9)
LYMPHOCYTES # BLD AUTO: 0.74 K/UL — LOW (ref 1–3.3)
LYMPHOCYTES # BLD AUTO: 7.9 % — LOW (ref 13–44)
MANUAL SMEAR VERIFICATION: SIGNIFICANT CHANGE UP
MCHC RBC-ENTMCNC: 22.1 PG — LOW (ref 27–34)
MCHC RBC-ENTMCNC: 30 GM/DL — LOW (ref 32–36)
MCV RBC AUTO: 73.8 FL — LOW (ref 80–100)
MONOCYTES # BLD AUTO: 0.94 K/UL — HIGH (ref 0–0.9)
MONOCYTES NFR BLD AUTO: 10 % — SIGNIFICANT CHANGE UP (ref 2–14)
NEUTROPHILS # BLD AUTO: 7.7 K/UL — HIGH (ref 1.8–7.4)
NEUTROPHILS NFR BLD AUTO: 81.7 % — HIGH (ref 43–77)
NRBC # BLD: 0 /100 WBCS — SIGNIFICANT CHANGE UP (ref 0–0)
OVALOCYTES BLD QL SMEAR: SLIGHT — SIGNIFICANT CHANGE UP
PLAT MORPH BLD: NORMAL — SIGNIFICANT CHANGE UP
PLATELET # BLD AUTO: 338 K/UL — SIGNIFICANT CHANGE UP (ref 150–400)
PLATELET COUNT - ESTIMATE: NORMAL — SIGNIFICANT CHANGE UP
POIKILOCYTOSIS BLD QL AUTO: SLIGHT — SIGNIFICANT CHANGE UP
POTASSIUM SERPL-MCNC: 4 MMOL/L — SIGNIFICANT CHANGE UP (ref 3.5–5.3)
POTASSIUM SERPL-SCNC: 4 MMOL/L — SIGNIFICANT CHANGE UP (ref 3.5–5.3)
RBC # BLD: 4.61 M/UL — SIGNIFICANT CHANGE UP (ref 3.8–5.2)
RBC # FLD: 15.1 % — HIGH (ref 10.3–14.5)
RBC BLD AUTO: ABNORMAL
SODIUM SERPL-SCNC: 138 MMOL/L — SIGNIFICANT CHANGE UP (ref 135–145)
STOMATOCYTES BLD QL SMEAR: SLIGHT — SIGNIFICANT CHANGE UP
WBC # BLD: 9.42 K/UL — SIGNIFICANT CHANGE UP (ref 3.8–10.5)
WBC # FLD AUTO: 9.42 K/UL — SIGNIFICANT CHANGE UP (ref 3.8–10.5)

## 2023-01-07 PROCEDURE — 86850 RBC ANTIBODY SCREEN: CPT

## 2023-01-07 PROCEDURE — 80048 BASIC METABOLIC PNL TOTAL CA: CPT

## 2023-01-07 PROCEDURE — C1889: CPT

## 2023-01-07 PROCEDURE — 86900 BLOOD TYPING SEROLOGIC ABO: CPT

## 2023-01-07 PROCEDURE — 82962 GLUCOSE BLOOD TEST: CPT

## 2023-01-07 PROCEDURE — S2900: CPT

## 2023-01-07 PROCEDURE — 85025 COMPLETE CBC W/AUTO DIFF WBC: CPT

## 2023-01-07 PROCEDURE — 86901 BLOOD TYPING SEROLOGIC RH(D): CPT

## 2023-01-07 PROCEDURE — 83036 HEMOGLOBIN GLYCOSYLATED A1C: CPT

## 2023-01-07 PROCEDURE — 36415 COLL VENOUS BLD VENIPUNCTURE: CPT

## 2023-01-07 PROCEDURE — 81025 URINE PREGNANCY TEST: CPT

## 2023-01-07 PROCEDURE — 93005 ELECTROCARDIOGRAM TRACING: CPT

## 2023-01-07 PROCEDURE — C9399: CPT

## 2023-01-07 RX ORDER — MECLIZINE HCL 12.5 MG
0 TABLET ORAL
Qty: 0 | Refills: 0 | DISCHARGE

## 2023-01-07 RX ADMIN — ONDANSETRON 4 MILLIGRAM(S): 8 TABLET, FILM COATED ORAL at 09:24

## 2023-01-07 RX ADMIN — Medication 1000 MILLIGRAM(S): at 09:40

## 2023-01-07 RX ADMIN — Medication 30 MILLIGRAM(S): at 06:27

## 2023-01-07 RX ADMIN — SCOPALAMINE 1 PATCH: 1 PATCH, EXTENDED RELEASE TRANSDERMAL at 06:27

## 2023-01-07 RX ADMIN — PANTOPRAZOLE SODIUM 40 MILLIGRAM(S): 20 TABLET, DELAYED RELEASE ORAL at 01:31

## 2023-01-07 RX ADMIN — Medication 1000 MILLIGRAM(S): at 01:58

## 2023-01-07 RX ADMIN — ONDANSETRON 4 MILLIGRAM(S): 8 TABLET, FILM COATED ORAL at 01:30

## 2023-01-07 RX ADMIN — Medication 30 MILLIGRAM(S): at 00:11

## 2023-01-07 RX ADMIN — Medication 30 MILLIGRAM(S): at 11:37

## 2023-01-07 RX ADMIN — Medication 10 MILLIGRAM(S): at 05:23

## 2023-01-07 RX ADMIN — Medication 400 MILLIGRAM(S): at 01:31

## 2023-01-07 RX ADMIN — Medication 30 MILLIGRAM(S): at 05:24

## 2023-01-07 RX ADMIN — Medication 10 MILLIGRAM(S): at 11:22

## 2023-01-07 RX ADMIN — Medication 400 MILLIGRAM(S): at 09:25

## 2023-01-07 RX ADMIN — ENOXAPARIN SODIUM 40 MILLIGRAM(S): 100 INJECTION SUBCUTANEOUS at 05:24

## 2023-01-07 RX ADMIN — Medication 30 MILLIGRAM(S): at 11:22

## 2023-01-07 RX ADMIN — SODIUM CHLORIDE 160 MILLILITER(S): 9 INJECTION, SOLUTION INTRAVENOUS at 05:48

## 2023-01-07 NOTE — DISCHARGE NOTE PROVIDER - NSDCFUSCHEDAPPT_GEN_ALL_CORE_FT
Yamile Moscoso  [facility] [address] [facility]  Scheduled Appointment: 01/17/2023    Bruce Mcqueen  [facility] [address] [facility]  Scheduled Appointment: 02/07/2023

## 2023-01-07 NOTE — DISCHARGE NOTE PROVIDER - CARE PROVIDER_API CALL
Bruce Mcqueen (MD; MD)  [facility]  [address] [place] [address]  Phone: [phone]  Fax: [phone]  Follow Up Time:

## 2023-01-07 NOTE — DISCHARGE NOTE NURSING/CASE MANAGEMENT/SOCIAL WORK - PATIENT PORTAL LINK FT
You can access the FollowMyHealth Patient Portal offered by [facility] by registering at the following website: [email or web address]. By joining [employer]’s FollowMyHealth portal, you will also be able to view your health information using other applications (apps) compatible with our system.

## 2023-01-07 NOTE — DISCHARGE NOTE NURSING/CASE MANAGEMENT/SOCIAL WORK - NSDCPEFALRISK_GEN_ALL_CORE
For information on Fall & Injury Prevention, visit: [email or web address] OR  [email or web address] OR  https://www.cdc.gov/steadi/patient.html

## 2023-01-07 NOTE — DISCHARGE NOTE PROVIDER - HOSPITAL COURSE
22 y.o. F with PMH morbid obesity and NADINE underwent uncomplicated robotic gastric bypass 1/6/23. Pt stable post-op, able to tolerate baratric clears. Discharged POD#1

## 2023-01-07 NOTE — DISCHARGE NOTE PROVIDER - NSDCCPTREATMENT_GEN_ALL_CORE_FT
PRINCIPAL PROCEDURE  Procedure: Robot-assisted laparoscopic Kriss-en-Y gastric bypass  Findings and Treatment:

## 2023-01-07 NOTE — PROGRESS NOTE ADULT - SUBJECTIVE AND OBJECTIVE BOX
Bariatric Surgery    Subjective:  Pt resting comfortably. No acute complaints  c/o epigastric pain.  Mild nausea.  Attempting to ambulate to void.    T(C): 36.6 (01-06-23 @ 16:49), Max: 36.9 (01-06-23 @ 16:15)  HR: 92 (01-06-23 @ 16:49) (90 - 98)  BP: 134/82 (01-06-23 @ 16:49) (104/58 - 142/61)  RR: 18 (01-06-23 @ 16:49) (16 - 25)  SpO2: 100% (01-06-23 @ 16:49) (94% - 100%)    Physical:  Gen: A&O x3  Abd: Soft ND, Dressing C/D/I    
INTERVAL HPI/OVERNIGHT EVENTS:  Pt resting comfortably. No acute complaints.   Tolerating sips of water.  Denies N/V.  Voided post op.  Ambulated.    MEDICATIONS  (STANDING):  acetaminophen   IVPB .. 1000 milliGRAM(s) IV Intermittent <User Schedule>  dextrose 5%. 1000 milliLiter(s) (50 mL/Hr) IV Continuous <Continuous>  dextrose 5%. 1000 milliLiter(s) (100 mL/Hr) IV Continuous <Continuous>  dextrose 50% Injectable 25 Gram(s) IV Push once  dextrose 50% Injectable 12.5 Gram(s) IV Push once  dextrose 50% Injectable 25 Gram(s) IV Push once  enoxaparin Injectable 40 milliGRAM(s) SubCutaneous every 12 hours  glucagon  Injectable 1 milliGRAM(s) IntraMuscular once  insulin lispro (ADMELOG) corrective regimen sliding scale   SubCutaneous three times a day before meals  insulin lispro (ADMELOG) corrective regimen sliding scale   SubCutaneous at bedtime  ketorolac   Injectable 30 milliGRAM(s) IV Push <User Schedule>  lactated ringers. 1000 milliLiter(s) (75 mL/Hr) IV Continuous <Continuous>  lactated ringers. 1000 milliLiter(s) (160 mL/Hr) IV Continuous <Continuous>  metoclopramide Injectable 10 milliGRAM(s) IV Push <User Schedule>  ondansetron Injectable 4 milliGRAM(s) IV Push <User Schedule>  pantoprazole  Injectable 40 milliGRAM(s) IV Push every 24 hours    MEDICATIONS  (PRN):  dextrose Oral Gel 15 Gram(s) Oral once PRN Blood Glucose LESS THAN 70 milliGRAM(s)/deciliter  fentaNYL    Injectable 25 MICROGram(s) IV Push every 5 minutes PRN Moderate Pain (4 - 6)  fentaNYL    Injectable 50 MICROGram(s) IV Push every 5 minutes PRN Severe Pain (7 - 10)  HYDROmorphone  Injectable 0.5 milliGRAM(s) IV Push every 3 hours PRN Severe Pain (7 - 10)  hyoscyamine SL 0.125 milliGRAM(s) SubLingual every 4 hours PRN Gastric Spasms  ondansetron Injectable 4 milliGRAM(s) IV Push once PRN Nausea and/or Vomiting      Vital Signs Last 24 Hrs  T(C): 36.8 (07 Jan 2023 06:11), Max: 36.9 (06 Jan 2023 16:15)  T(F): 98.3 (07 Jan 2023 06:11), Max: 98.4 (06 Jan 2023 16:15)  HR: 73 (07 Jan 2023 06:11) (73 - 98)  BP: 139/59 (07 Jan 2023 06:11) (101/55 - 142/61)  BP(mean): 82 (06 Jan 2023 16:15) (75 - 82)  RR: 18 (07 Jan 2023 06:11) (16 - 25)  SpO2: 98% (07 Jan 2023 06:11) (94% - 100%)    Parameters below as of 07 Jan 2023 06:11  Patient On (Oxygen Delivery Method): room air    Physical:  General: A&Ox3. NAD.  Abdomen: Soft nondistended, nontender.    I&O's Detail    06 Jan 2023 07:01  -  07 Jan 2023 07:00  --------------------------------------------------------  IN:    Lactated Ringers Bolus: 1500 mL  Total IN: 1500 mL    OUT:    Voided (mL): 1350 mL  Total OUT: 1350 mL    Total NET: 150 mL    LABS:                        10.2   9.42  )-----------( 338      ( 07 Jan 2023 04:51 )             34.0             01-07    138  |  106  |  8   ----------------------------<  102<H>  4.0   |  22  |  0.82    Ca    9.5      07 Jan 2023 04:51

## 2023-01-07 NOTE — DISCHARGE NOTE PROVIDER - NSDCCPCAREPLAN_GEN_ALL_CORE_FT
PRINCIPAL DISCHARGE DIAGNOSIS  Diagnosis: Morbid obesity  Assessment and Plan of Treatment: May shower. Leave steristrips intact.   Bariatric liquids only as directed.  No heavy lifting, straining, exercises for 2 weeks.   Take post-surgery medications as instructed.      SECONDARY DISCHARGE DIAGNOSES  Diagnosis: HTN (hypertension)  Assessment and Plan of Treatment: Resume home medications but crush all tablets and mix with liquid     PRINCIPAL DISCHARGE DIAGNOSIS  Diagnosis: Morbid obesity  Assessment and Plan of Treatment: May shower. Leave steristrips intact.   Bariatric liquids only as directed.  No heavy lifting, straining, exercises for 2 weeks.   Take post-surgery medications as instructed.

## 2023-01-07 NOTE — CHART NOTE - NSCHARTNOTEFT_GEN_A_CORE
Focus note:   Admit with morbid obesity (BMI 55.8). S/p gastric bypass 1/6.  Pt tolerating clear liquid diet (bariatric). Pt has  discharge instruction sheet and Bariatric Surgery Nutrition Guidelines. Reviewed diet protocols, emphasis on  adequate protein/fluid intake. Discussed support groups, exercise, sleep and follow-up with LOY DELEON to monitor. KD faulkner

## 2023-01-07 NOTE — PROGRESS NOTE ADULT - ASSESSMENT
22y.o. Female s/p robotic gastric bypass POD#1    -Start bariatric clears  -d/c planning  -Standing pain control  -Standing antiemesis

## 2023-01-09 RX ORDER — HYOSCYAMINE SULFATE 0.12 MG/1
0.12 TABLET SUBLINGUAL 3 TIMES DAILY
Qty: 21 | Refills: 0 | Status: ACTIVE | COMMUNITY
Start: 2023-01-09 | End: 1900-01-01

## 2023-01-09 RX ORDER — HYOSCYAMINE SULFATE 0.12 MG/1
0.12 TABLET SUBLINGUAL 3 TIMES DAILY
Qty: 21 | Refills: 0 | Status: DISCONTINUED | COMMUNITY
Start: 2022-07-26 | End: 2023-01-09

## 2023-01-09 RX ORDER — OMEPRAZOLE 40 MG/1
40 CAPSULE, DELAYED RELEASE ORAL
Qty: 30 | Refills: 3 | Status: DISCONTINUED | COMMUNITY
Start: 2022-07-26 | End: 2023-01-09

## 2023-01-09 RX ORDER — ONDANSETRON 4 MG/1
4 TABLET, ORALLY DISINTEGRATING ORAL 3 TIMES DAILY
Qty: 42 | Refills: 0 | Status: ACTIVE | COMMUNITY
Start: 2023-01-09 | End: 1900-01-01

## 2023-01-09 RX ORDER — OMEPRAZOLE 40 MG/1
40 CAPSULE, DELAYED RELEASE ORAL
Qty: 30 | Refills: 3 | Status: ACTIVE | COMMUNITY
Start: 2023-01-09 | End: 1900-01-01

## 2023-01-09 RX ORDER — ONDANSETRON 4 MG/1
4 TABLET, ORALLY DISINTEGRATING ORAL 3 TIMES DAILY
Qty: 42 | Refills: 0 | Status: DISCONTINUED | COMMUNITY
Start: 2022-07-26 | End: 2023-01-09

## 2023-01-17 ENCOUNTER — APPOINTMENT (OUTPATIENT)
Dept: SURGERY | Facility: CLINIC | Age: 23
End: 2023-01-17
Payer: COMMERCIAL

## 2023-01-17 VITALS
TEMPERATURE: 96.9 F | HEIGHT: 63 IN | HEART RATE: 111 BPM | DIASTOLIC BLOOD PRESSURE: 80 MMHG | WEIGHT: 286 LBS | SYSTOLIC BLOOD PRESSURE: 108 MMHG | BODY MASS INDEX: 50.68 KG/M2

## 2023-01-17 DIAGNOSIS — Z98.84 BARIATRIC SURGERY STATUS: ICD-10-CM

## 2023-01-17 PROCEDURE — 99024 POSTOP FOLLOW-UP VISIT: CPT

## 2023-01-17 NOTE — PHYSICAL EXAM
[de-identified] : Normal, PERRL, EOMI, NO conjunctival infections  [de-identified] : Breath sounds equal and bilateral, no wheezing no rales or rhonchi  [de-identified] :  good S1, S2, no murmurs, rubs, gallops bilateral  [de-identified] : Incision sites are healing well

## 2023-01-17 NOTE — HISTORY OF PRESENT ILLNESS
[Procedure: ___] : Procedure performed: [unfilled]  [Date of Surgery: ___] : Date of Surgery:   [unfilled] [Surgeon Name:   ___] : Surgeon Name: Dr. DIEGO [Pre-Op Weight ___] : Pre-op weight was [unfilled] lbs [Phase 2] : Phase 2 [Walking] : walking [de-identified] : Today patient is doing well, offers no complaints. Denies any fevers, chills, nausea, vomiting, diarrhea or constipation. Patient able to tolerate stage 2 diet with normal bowel movements. Surgical incisions are healing well. No sign of inflammation or exudate.Patient compliant with medications and vitamins  (MVI, Calcium and vitamin D, and vitamin B complex ), drinks not enough fluids.  Patient denies any pain or discomfort at present time. Patient's questions and concerns addressed to patient's satisfaction. Patient has no recent ER visit post surgery

## 2023-01-17 NOTE — REASON FOR VISIT
[Gastric By-pass] : gastric by-pass [de-identified] : 01/06/2023 [de-identified] : Robotic assisted Laparoscopic  Gastric Bypass on 01/06/2023

## 2023-01-17 NOTE — ASSESSMENT
[de-identified] : JOSÉ MANUEL UMAÑA is a 22 year old female who underwent a Robotic assisted Laparoscopic  Gastric Bypass on 01/06/2023\par On oral medication and vitamins. Feels  well \par Pre-op weight: 317 lbs\par Ideal body weight: 115 lbs\par Today's weight: 286 lbs. Today's BMI : 50.66 kg/m2\par Excess body weight: 202 lbs\par total weight loss since surgery: 31 lbs\par % of Excess Body Weight Loss: 15 % which is on target. \par - Stay well hydrated.\par - Reinforced need for daily MVI, vit D, and vit B 12.\par - Reinforced need for adequate hydration (at least 64 oz daily) and adequate protein intake (at least 60 g daily)\par - Reinforced need to chew food properly before swallowing. \par - Instructed patient not to eat and drink at the same time and to space them out 30 minutes apart. \par - Instructed patient not to drink from a straw, chew gum, or drink carbonated beverages.\par - Informed patient about post-op support groups held every 3rd Tuesday of the month.\par - Please continue with  stage 2 solid food at this time. \par - Informed ON restrictions with exercise at this time. \par - Actigall for 6 month  will be started next visit\par - Omeprazole for 3 month started at [facility] \par - Follow-up in 3 weeks\par - Call with concerns. \par - Follow-up blood work 3 month postop

## 2023-01-30 ENCOUNTER — NON-APPOINTMENT (OUTPATIENT)
Age: 23
End: 2023-01-30

## 2023-01-31 NOTE — ASSESSMENT
[FreeTextEntry1] : JOSÉ MANUEL UMAÑA is a 22 year old female who underwent a Robotic assisted Laparoscopic Gastric Bypass on 01/06/2023\par On oral medication and vitamins. Feels _ \par Pre-op weight: 317 lbs\par Ideal body weight: 115 lbs\par Today's weight: _ lbs. Today's BMI : _ kg/m2\par Excess body weight: 202 lbs\par total weight loss since surgery: _ lbs\par % of Excess Body Weight Loss: _ % which is on target. \par - Stay well hydrated.\par - Reinforced need for daily MVI, vit D, and vit B 12.\par - Reinforced need for adequate hydration (at least 64 oz daily) and adequate protein intake (at least 60 g daily)\par - Reinforced need to chew food properly before swallowing. \par - Instructed patient not to eat and drink at the same time and to space them out 30 minutes apart. \par - Instructed patient not to drink from a straw, chew gum, or drink carbonated beverages.\par - Informed patient about post-op support groups held every 3rd Tuesday of the month.\par - Please continue with stage 3 solid food at this time. \par - Informed NO restrictions with exercise at this time. \par - Actigall for 6 month will be started this visit\par - Omeprazole for 3 month started at [facility] \par - Follow-up in 3 weeks\par - Call with concerns. \par - Follow-up blood work 3 month postop \par

## 2023-01-31 NOTE — HISTORY OF PRESENT ILLNESS
[de-identified] : JOSÉ MANUEL UMAÑA is a 22 year old female who present in the office for a follow up s/p  Robotic assisted Laparoscopic Gastric Bypass on 01/06/2023

## 2023-02-07 ENCOUNTER — APPOINTMENT (OUTPATIENT)
Dept: SURGERY | Facility: CLINIC | Age: 23
End: 2023-02-07

## 2023-02-08 ENCOUNTER — NON-APPOINTMENT (OUTPATIENT)
Age: 23
End: 2023-02-08

## 2023-06-06 NOTE — PATIENT PROFILE ADULT - VISION (WITH CORRECTIVE LENSES IF THE PATIENT USUALLY WEARS THEM):
Partially impaired: cannot see medication labels or newsprint, but can see obstacles in path, and the surrounding layout; can count fingers at arm's length
warm and dry/color normal/normal/no rashes/no ulcers

## 2023-08-16 NOTE — H&P PST ADULT - NEGATIVE PSYCHIATRIC SYMPTOMS
1/25/2022          No future appointments. no suicidal ideation/no depression/no insomnia/no mood swings

## 2023-12-18 NOTE — H&P PST ADULT - PATIENT'S SEXUAL ORIENTATION
Medication(s) Requested: lomotil  Last office visit: 1/25/23  Last refill: 11/21/23  Is the patient due for refill of this medication(s): Yes  PDMP review: Criteria met. Forwarded to Physician/SANTY for signature.   
Heterosexual

## 2024-03-21 NOTE — REVIEW OF SYSTEMS
[FreeTextEntry1] :  She reports continued cognitive decline with an increasing impact on her daily functioning, including finances. [Negative] : Allergic/Immunologic

## 2024-10-15 NOTE — ED ADULT NURSE NOTE - PAIN: PRESENCE, MLM
Detail Level: Zone
General Sunscreen Counseling: I recommended a broad spectrum sunscreen with a SPF of 30 or higher.  I explained that SPF 30 sunscreens block approximately 97 percent of the sun's harmful rays.  Sunscreens should be applied at least 15 minutes prior to expected sun exposure and then every 2 hours after that as long as sun exposure continues. If swimming or exercising sunscreen should be reapplied every 45 minutes to an hour after getting wet or sweating.  One ounce, or the equivalent of a shot glass full of sunscreen, is adequate to protect the skin not covered by a bathing suit. I also recommended a lip balm with a sunscreen. Sun protective clothing can be used in lieu of sunscreen but must be worn the entire time you are exposed to the sun's rays.
Detail Level: Detailed
complains of pain/discomfort

## (undated) DEVICE — BITE BLOCK SCOPE SAVER 20X27MM ADULT GREEN

## (undated) DEVICE — BITE BLOCK MOUTHPCW/STRAP

## (undated) DEVICE — TIP METZENBAUM SCISSOR MONOPOLAR ENDOCUT (ORANGE)

## (undated) DEVICE — GLV 7.5 PROTEXIS (WHITE)

## (undated) DEVICE — VALVE ENDO SURESEAL II 0-5FR

## (undated) DEVICE — WRAP COMPRESSION CALF MED

## (undated) DEVICE — VENODYNE/SCD SLEEVE CALF MEDIUM

## (undated) DEVICE — SOL INJ NS 0.9% 500ML 1-PORT

## (undated) DEVICE — GLV 8 PROTEXIS (WHITE)

## (undated) DEVICE — FORMALIN CONTAINER MED

## (undated) DEVICE — TUBING CANNULA SALTER LABS NASAL ADULT 7FT

## (undated) DEVICE — XI VESSEL SEALER

## (undated) DEVICE — GLV 6.5 PROTEXIS (WHITE)

## (undated) DEVICE — KIT ENDO CONSUMABLES REPROCESSING IF1000 8 USE

## (undated) DEVICE — SNARE CAPTIVATOR II 15MM

## (undated) DEVICE — GLV 8.5 PROTEXIS (WHITE)

## (undated) DEVICE — RADIAL JAW 4 240CM WITH NDL

## (undated) DEVICE — MASK OXYGEN PANORAMIC

## (undated) DEVICE — SOLIDIFIER CANN EXPRESS 3K

## (undated) DEVICE — XI STAPLER SUREFORM 60